# Patient Record
Sex: MALE | Race: WHITE
[De-identification: names, ages, dates, MRNs, and addresses within clinical notes are randomized per-mention and may not be internally consistent; named-entity substitution may affect disease eponyms.]

---

## 2018-09-01 ENCOUNTER — HOSPITAL ENCOUNTER (INPATIENT)
Dept: HOSPITAL 54 - ER | Age: 48
LOS: 17 days | Discharge: HOME | DRG: 710 | End: 2018-09-18
Attending: NURSE PRACTITIONER | Admitting: NURSE PRACTITIONER
Payer: MEDICAID

## 2018-09-01 VITALS — SYSTOLIC BLOOD PRESSURE: 118 MMHG | DIASTOLIC BLOOD PRESSURE: 83 MMHG

## 2018-09-01 VITALS — SYSTOLIC BLOOD PRESSURE: 124 MMHG | DIASTOLIC BLOOD PRESSURE: 81 MMHG

## 2018-09-01 VITALS — SYSTOLIC BLOOD PRESSURE: 140 MMHG | DIASTOLIC BLOOD PRESSURE: 90 MMHG

## 2018-09-01 VITALS — BODY MASS INDEX: 25.11 KG/M2 | HEIGHT: 67 IN | WEIGHT: 160 LBS

## 2018-09-01 VITALS — DIASTOLIC BLOOD PRESSURE: 78 MMHG | SYSTOLIC BLOOD PRESSURE: 122 MMHG

## 2018-09-01 VITALS — DIASTOLIC BLOOD PRESSURE: 86 MMHG | SYSTOLIC BLOOD PRESSURE: 122 MMHG

## 2018-09-01 VITALS — SYSTOLIC BLOOD PRESSURE: 120 MMHG | DIASTOLIC BLOOD PRESSURE: 84 MMHG

## 2018-09-01 VITALS — DIASTOLIC BLOOD PRESSURE: 83 MMHG | SYSTOLIC BLOOD PRESSURE: 122 MMHG

## 2018-09-01 VITALS — SYSTOLIC BLOOD PRESSURE: 133 MMHG | DIASTOLIC BLOOD PRESSURE: 86 MMHG

## 2018-09-01 DIAGNOSIS — J96.01: ICD-10-CM

## 2018-09-01 DIAGNOSIS — F12.929: ICD-10-CM

## 2018-09-01 DIAGNOSIS — A41.89: Primary | ICD-10-CM

## 2018-09-01 DIAGNOSIS — F43.23: ICD-10-CM

## 2018-09-01 DIAGNOSIS — I10: ICD-10-CM

## 2018-09-01 DIAGNOSIS — K57.21: ICD-10-CM

## 2018-09-01 DIAGNOSIS — K66.8: ICD-10-CM

## 2018-09-01 DIAGNOSIS — K56.7: ICD-10-CM

## 2018-09-01 DIAGNOSIS — R73.9: ICD-10-CM

## 2018-09-01 DIAGNOSIS — F32.9: ICD-10-CM

## 2018-09-01 LAB
ALBUMIN SERPL BCP-MCNC: 3.8 G/DL (ref 3.4–5)
ALP SERPL-CCNC: 92 U/L (ref 46–116)
ALT SERPL W P-5'-P-CCNC: 29 U/L (ref 12–78)
APTT PPP: 22 SEC (ref 23–34)
AST SERPL W P-5'-P-CCNC: 17 U/L (ref 15–37)
BASOPHILS # BLD AUTO: 0.1 /CMM (ref 0–0.2)
BASOPHILS NFR BLD AUTO: 1 % (ref 0–2)
BILIRUB DIRECT SERPL-MCNC: 0.1 MG/DL (ref 0–0.2)
BILIRUB SERPL-MCNC: 0.5 MG/DL (ref 0.2–1)
BUN SERPL-MCNC: 17 MG/DL (ref 7–18)
CALCIUM SERPL-MCNC: 8.6 MG/DL (ref 8.5–10.1)
CHLORIDE SERPL-SCNC: 106 MMOL/L (ref 98–107)
CO2 SERPL-SCNC: 24 MMOL/L (ref 21–32)
CREAT SERPL-MCNC: 1.1 MG/DL (ref 0.6–1.3)
EOSINOPHIL NFR BLD AUTO: 6.4 % (ref 0–6)
GLUCOSE SERPL-MCNC: 199 MG/DL (ref 74–106)
HCT VFR BLD AUTO: 49 % (ref 39–51)
HGB BLD-MCNC: 15.3 G/DL (ref 13.5–17.5)
INR PPP: 0.94 (ref 0.87–1.13)
LIPASE SERPL-CCNC: 1187 U/L (ref 73–393)
LYMPHOCYTES NFR BLD AUTO: 3.3 /CMM (ref 0.8–4.8)
LYMPHOCYTES NFR BLD AUTO: 41.5 % (ref 20–44)
MCHC RBC AUTO-ENTMCNC: 32 G/DL (ref 31–36)
MCV RBC AUTO: 91 FL (ref 80–96)
MONOCYTES NFR BLD AUTO: 0.7 /CMM (ref 0.1–1.3)
MONOCYTES NFR BLD AUTO: 9.2 % (ref 2–12)
NEUTROPHILS # BLD AUTO: 3.3 /CMM (ref 1.8–8.9)
NEUTROPHILS NFR BLD AUTO: 41.9 % (ref 43–81)
PLATELET # BLD AUTO: 298 /CMM (ref 150–450)
POTASSIUM SERPL-SCNC: 3.9 MMOL/L (ref 3.5–5.1)
PROT SERPL-MCNC: 7.3 G/DL (ref 6.4–8.2)
RBC # BLD AUTO: 5.36 MIL/UL (ref 4.5–6)
RDW COEFFICIENT OF VARIATION: 12.9 (ref 11.5–15)
SODIUM SERPL-SCNC: 141 MMOL/L (ref 136–145)
TROPONIN I SERPL-MCNC: < 0.017 NG/ML (ref 0–0.06)
WBC NRBC COR # BLD AUTO: 7.8 K/UL (ref 4.3–11)

## 2018-09-01 PROCEDURE — A4216 STERILE WATER/SALINE, 10 ML: HCPCS

## 2018-09-01 PROCEDURE — C1751 CATH, INF, PER/CENT/MIDLINE: HCPCS

## 2018-09-01 PROCEDURE — A6402 STERILE GAUZE <= 16 SQ IN: HCPCS

## 2018-09-01 PROCEDURE — C9113 INJ PANTOPRAZOLE SODIUM, VIA: HCPCS

## 2018-09-01 PROCEDURE — 0DTN0ZZ RESECTION OF SIGMOID COLON, OPEN APPROACH: ICD-10-PCS | Performed by: SURGERY

## 2018-09-01 PROCEDURE — A4362 SOLID SKIN BARRIER: HCPCS

## 2018-09-01 PROCEDURE — Z7610: HCPCS

## 2018-09-01 PROCEDURE — A6253 ABSORPT DRG > 48 SQ IN W/O B: HCPCS

## 2018-09-01 PROCEDURE — 0D1N0Z4 BYPASS SIGMOID COLON TO CUTANEOUS, OPEN APPROACH: ICD-10-PCS | Performed by: SURGERY

## 2018-09-01 PROCEDURE — A6403 STERILE GAUZE>16 <= 48 SQ IN: HCPCS

## 2018-09-01 PROCEDURE — 0DTJ4ZZ RESECTION OF APPENDIX, PERCUTANEOUS ENDOSCOPIC APPROACH: ICD-10-PCS | Performed by: SURGERY

## 2018-09-01 RX ADMIN — Medication SCH MG: at 17:00

## 2018-09-01 RX ADMIN — SODIUM CHLORIDE, SODIUM LACTATE, POTASSIUM CHLORIDE, AND CALCIUM CHLORIDE PRN MLS/HR: .6; .31; .03; .02 INJECTION, SOLUTION INTRAVENOUS at 17:27

## 2018-09-01 RX ADMIN — PIPERACILLIN SODIUM AND TAZOBACTAM SODIUM SCH MLS/HR: .375; 3 INJECTION, POWDER, LYOPHILIZED, FOR SOLUTION INTRAVENOUS at 18:01

## 2018-09-01 RX ADMIN — DEXTROSE MONOHYDRATE PRN MG: 50 INJECTION, SOLUTION INTRAVENOUS at 23:09

## 2018-09-01 RX ADMIN — PIPERACILLIN SODIUM AND TAZOBACTAM SODIUM SCH MLS/HR: .375; 3 INJECTION, POWDER, LYOPHILIZED, FOR SOLUTION INTRAVENOUS at 23:14

## 2018-09-01 RX ADMIN — HYDROMORPHONE HYDROCHLORIDE PRN MG: 1 INJECTION, SOLUTION INTRAMUSCULAR; INTRAVENOUS; SUBCUTANEOUS at 23:06

## 2018-09-01 NOTE — NUR
TELE RN CLOSING NOTES



PATIENT IN BED ASLEEP AT MODERATE HIGH BACKREST POSITION AT THIS TIME, EASILY AROUSABLE. ON 
0N 02 VIA N./C @ 3LPM AT THIS TIME, BREATHING EVEN AND UNLABORED. ON TELE-MONITORING WITH 
CURRENT READING OF SR WITH HR OF 88-95. SURGICAL INCISIONS ON UPPER MID ABDOMEN AND 
UMBILICUS AREA INTACT AND CLEAN WITH NO ACTIVE BLEEDING NOTED. PT WITH 2 DAPHNEY DRAINS: LEFT 
UPPER QUADRANT #1 WITH 20ML OUTPUT  AND LOWER MID ABDOMEN  #2 WITH 15ML OUTPUT, BOTH WITH 
SEROSANGUINEOUS DRAINAGE. DRESSINGS TO BOTH DAPHNEY DRAINS C/D/I. PT WITH COLOSTOMY IN PLACE, NO 
OUTPUT NOTED AT THIS TIME. IV ACCESS ON LEFT AC g#18 INTACT AND PATENT, IVF OF LR @ 140ML 
/HR RUNNING AT THIS TIME X 48HRS THEN SWITCH TO D5 1/2 NS + 20 MEQ KCL @ 100ML/HR. ALL 
SAFETY MEASURES KEPT IN PLACE. BED IN LOWEST LOCK POSITION WITH SIDE-RAILS UP X 2. CALL 
LIGHT WITHIN REACH. WILL ENDORSE TO NIGHT SHIFT NURSE FOR FANTA.

## 2018-09-01 NOTE — NUR
TELE/RN NOTES



PT C/O DULL PAIN TO ENTIRE ABDOMEN. REQUESTING PAIN MEDICATION. ADMINISTERED DILAUDID AS 
ORDERED. UPON ADMINISTRATION, PT C/O NAUSEA. ADMINISTERED PRN ZOFRAN AS WELL.

## 2018-09-01 NOTE — NUR
TELE/RN NOTES



EMPTIED 20ML FROM DAPHNEY DRAIN #1 AND 80ML FROM DAPHNEY DRAIN #2. SEROSANGUINOUS OUTPUT

## 2018-09-01 NOTE — NUR
TELE/RN OPENING NOTES



PT RECEIVED ASLEEP, OPENS EYES TO NAME/ TOUCH. ON 3L O2 VIA NC, BREATHING EVEN AND 
UNLABORED. NO C/O PAIN OR SOB AT THIS TIME. ON TELE MONITOR SHOWING SR 97. IV TO LAC PATENT 
AND INTACT RUNNING IVF AS ORDERED. LAW CATH DRAINING TO GRAVITY. BED IN LOW/LOCKED 
POSITION WITH CALL LIGHT IN REACH. BILATERAL UPPER SIDE RAILS IN PLACE. WILL CONTINUE TO 
MONITOR

## 2018-09-01 NOTE — NUR
BIB ; ABD PAIN X 30 MIN. NOTED KAILYN, IN DISTRESS, PALE IN COLOR. MD AT 
BS FOR EVAL. IV ACCESS PTA. BLOOD DRAWN FOR LABS. SAFETY AND COMFORT MEASURES 
PROVIDED. WILL MONITOR.

## 2018-09-01 NOTE — NUR
PT VERBALIZES UNDERSTANDING OF THE PROCEDURE, CONSENTS SIGNED FOR BLOOD 
TRANSFUSION AND APPENDECTOMY PROCEDURE.

## 2018-09-02 VITALS — SYSTOLIC BLOOD PRESSURE: 104 MMHG | DIASTOLIC BLOOD PRESSURE: 60 MMHG

## 2018-09-02 VITALS — DIASTOLIC BLOOD PRESSURE: 86 MMHG | SYSTOLIC BLOOD PRESSURE: 138 MMHG

## 2018-09-02 VITALS — SYSTOLIC BLOOD PRESSURE: 126 MMHG | DIASTOLIC BLOOD PRESSURE: 85 MMHG

## 2018-09-02 VITALS — SYSTOLIC BLOOD PRESSURE: 132 MMHG | DIASTOLIC BLOOD PRESSURE: 72 MMHG

## 2018-09-02 VITALS — SYSTOLIC BLOOD PRESSURE: 120 MMHG | DIASTOLIC BLOOD PRESSURE: 72 MMHG

## 2018-09-02 LAB
ALBUMIN SERPL BCP-MCNC: 2.3 G/DL (ref 3.4–5)
ALP SERPL-CCNC: 49 U/L (ref 46–116)
ALT SERPL W P-5'-P-CCNC: 27 U/L (ref 12–78)
AST SERPL W P-5'-P-CCNC: 22 U/L (ref 15–37)
BASOPHILS # BLD AUTO: 0 /CMM (ref 0–0.2)
BASOPHILS NFR BLD AUTO: 0 % (ref 0–2)
BILIRUB SERPL-MCNC: 1.1 MG/DL (ref 0.2–1)
BUN SERPL-MCNC: 11 MG/DL (ref 7–18)
CALCIUM SERPL-MCNC: 7.4 MG/DL (ref 8.5–10.1)
CHLORIDE SERPL-SCNC: 106 MMOL/L (ref 98–107)
CHOLEST SERPL-MCNC: 77 MG/DL (ref ?–200)
CO2 SERPL-SCNC: 28 MMOL/L (ref 21–32)
CREAT SERPL-MCNC: 1.1 MG/DL (ref 0.6–1.3)
EOSINOPHIL NFR BLD AUTO: 0 % (ref 0–6)
GLUCOSE SERPL-MCNC: 130 MG/DL (ref 74–106)
HCT VFR BLD AUTO: 49 % (ref 39–51)
HDLC SERPL-MCNC: 37 MG/DL (ref 40–60)
HGB BLD-MCNC: 15.4 G/DL (ref 13.5–17.5)
LDLC SERPL DIRECT ASSAY-MCNC: 31 MG/DL (ref 0–99)
LIPASE SERPL-CCNC: 82 U/L (ref 73–393)
LYMPHOCYTES NFR BLD AUTO: 0.5 /CMM (ref 0.8–4.8)
LYMPHOCYTES NFR BLD AUTO: 3.7 % (ref 20–44)
LYMPHOCYTES NFR BLD MANUAL: 8 % (ref 16–48)
MAGNESIUM SERPL-MCNC: 1.3 MG/DL (ref 1.8–2.4)
MCHC RBC AUTO-ENTMCNC: 32 G/DL (ref 31–36)
MCV RBC AUTO: 92 FL (ref 80–96)
METAMYELOCYTES NFR BLD MANUAL: 1 % (ref 0–0)
MONOCYTES NFR BLD AUTO: 0.4 /CMM (ref 0.1–1.3)
MONOCYTES NFR BLD AUTO: 2.6 % (ref 2–12)
MONOCYTES NFR BLD MANUAL: 4 % (ref 0–11)
MYELOCYTES NFR BLD MANUAL: 3 % (ref 0–0)
NEUTROPHILS # BLD AUTO: 13 /CMM (ref 1.8–8.9)
NEUTROPHILS NFR BLD AUTO: 93.7 % (ref 43–81)
NEUTS BAND NFR BLD MANUAL: 37 % (ref 0–5)
NEUTS SEG NFR BLD MANUAL: 47 % (ref 42–76)
PHOSPHATE SERPL-MCNC: 3.7 MG/DL (ref 2.5–4.9)
PLATELET # BLD AUTO: 179 /CMM (ref 150–450)
POTASSIUM SERPL-SCNC: 4.1 MMOL/L (ref 3.5–5.1)
PROT SERPL-MCNC: 5.3 G/DL (ref 6.4–8.2)
RBC # BLD AUTO: 5.28 MIL/UL (ref 4.5–6)
RDW COEFFICIENT OF VARIATION: 13.1 (ref 11.5–15)
SODIUM SERPL-SCNC: 141 MMOL/L (ref 136–145)
TRIGL SERPL-MCNC: 50 MG/DL (ref 30–150)
TSH SERPL DL<=0.005 MIU/L-ACNC: 1.09 UIU/ML (ref 0.36–3.74)
WBC NRBC COR # BLD AUTO: 13.9 K/UL (ref 4.3–11)

## 2018-09-02 RX ADMIN — HYDROMORPHONE HYDROCHLORIDE PRN MG: 1 INJECTION, SOLUTION INTRAMUSCULAR; INTRAVENOUS; SUBCUTANEOUS at 17:10

## 2018-09-02 RX ADMIN — SODIUM CHLORIDE, SODIUM LACTATE, POTASSIUM CHLORIDE, AND CALCIUM CHLORIDE PRN MLS/HR: .6; .31; .03; .02 INJECTION, SOLUTION INTRAVENOUS at 09:21

## 2018-09-02 RX ADMIN — MAGNESIUM SULFATE IN DEXTROSE SCH MLS/HR: 10 INJECTION, SOLUTION INTRAVENOUS at 12:12

## 2018-09-02 RX ADMIN — PIPERACILLIN SODIUM AND TAZOBACTAM SODIUM SCH MLS/HR: .375; 3 INJECTION, POWDER, LYOPHILIZED, FOR SOLUTION INTRAVENOUS at 11:36

## 2018-09-02 RX ADMIN — MAGNESIUM SULFATE IN DEXTROSE SCH MLS/HR: 10 INJECTION, SOLUTION INTRAVENOUS at 15:38

## 2018-09-02 RX ADMIN — SODIUM CHLORIDE, SODIUM LACTATE, POTASSIUM CHLORIDE, AND CALCIUM CHLORIDE PRN MLS/HR: .6; .31; .03; .02 INJECTION, SOLUTION INTRAVENOUS at 01:23

## 2018-09-02 RX ADMIN — HYDROMORPHONE HYDROCHLORIDE PRN MG: 1 INJECTION, SOLUTION INTRAMUSCULAR; INTRAVENOUS; SUBCUTANEOUS at 06:19

## 2018-09-02 RX ADMIN — PIPERACILLIN SODIUM AND TAZOBACTAM SODIUM SCH MLS/HR: .375; 3 INJECTION, POWDER, LYOPHILIZED, FOR SOLUTION INTRAVENOUS at 06:11

## 2018-09-02 RX ADMIN — Medication SCH MG: at 08:38

## 2018-09-02 RX ADMIN — HYDROMORPHONE HYDROCHLORIDE PRN MG: 1 INJECTION, SOLUTION INTRAMUSCULAR; INTRAVENOUS; SUBCUTANEOUS at 12:12

## 2018-09-02 RX ADMIN — SODIUM CHLORIDE SCH MG: 9 INJECTION, SOLUTION INTRAVENOUS at 09:21

## 2018-09-02 RX ADMIN — MAGNESIUM SULFATE IN DEXTROSE SCH MLS/HR: 10 INJECTION, SOLUTION INTRAVENOUS at 13:14

## 2018-09-02 RX ADMIN — DEXTROSE MONOHYDRATE PRN MG: 50 INJECTION, SOLUTION INTRAVENOUS at 06:17

## 2018-09-02 RX ADMIN — HYDROMORPHONE HYDROCHLORIDE PRN MG: 1 INJECTION, SOLUTION INTRAMUSCULAR; INTRAVENOUS; SUBCUTANEOUS at 21:12

## 2018-09-02 RX ADMIN — Medication SCH MG: at 16:48

## 2018-09-02 RX ADMIN — DEXTROSE MONOHYDRATE SCH MLS/HR: 50 INJECTION, SOLUTION INTRAVENOUS at 14:33

## 2018-09-02 RX ADMIN — MAGNESIUM SULFATE IN DEXTROSE SCH MLS/HR: 10 INJECTION, SOLUTION INTRAVENOUS at 16:48

## 2018-09-02 RX ADMIN — DEXTROSE MONOHYDRATE PRN MG: 50 INJECTION, SOLUTION INTRAVENOUS at 21:03

## 2018-09-02 RX ADMIN — PIPERACILLIN SODIUM AND TAZOBACTAM SODIUM SCH MLS/HR: .375; 3 INJECTION, POWDER, LYOPHILIZED, FOR SOLUTION INTRAVENOUS at 18:10

## 2018-09-02 RX ADMIN — DEXTROSE MONOHYDRATE PRN MG: 50 INJECTION, SOLUTION INTRAVENOUS at 12:12

## 2018-09-02 RX ADMIN — DEXTROSE MONOHYDRATE SCH MLS/HR: 50 INJECTION, SOLUTION INTRAVENOUS at 21:14

## 2018-09-02 NOTE — NUR
RN OPENING NOTES



RECEIVED REPORT FROM ISABELLA SERVIN. FOUND Pt AWAKE, RESTING IN BED. NO S/S OF ACUTE 
DISTRESS OR SEVERE SOB NOTED. Pt IS A/OX3, VERBAL, ABLE TO MAKE NEEDS KNOWN. ON TELE 
MONITOR, HR 96. DAPHNEY DRAIN #1 & DAPHNEY DRAIN #2 IN PLACE AND INTACT, NO SIGNS OF BLEEDING AROUND 
SURGICAL WOUND SITE. COLOSTOMY BAG IN PLACE. LAW CATHETER IN PLACE DRAINING WELL. IV 
ACCESS ON LAC #18G, IVF LR @140ML/HR, INFUSING WELL. STILL NPO PER SURGERY. SAFETY MEASURES 
IN PLACE. BED LOW, LOCKED, HOB ELEVATED, SIDE RAILS UP, CALL LIGHT AND BEDSIDE TABLE WITHIN 
REACH. WILL CONTINUE TO MONITOR Pt THROUGHOUT THE NIGHT FOR SAFETY.

## 2018-09-02 NOTE — NUR
RN NOTES

PT IS LAYING DOWN IN BED, RESTING COMFORTABLY. PT ON RA, RESPIRATIONS ARE EVEN AND 
UNLABORED. IV ON LAC INTACT AND RUNNING NS @ 140ML/HR. COLOSTOMY IS IN PLACE. DAPHNEY DRAINS IN 
PLACE AND DRAINING SEROSANGUINEOUS FLUID. LAW CATHETER IS IN PLACE AND DRAINING TO 
GRAVITY. TELE MONITOR SHOWS SINUS RHYTHM 95. NO SIGNS OF DISTRESS NOTED. SAFETY MEASURES ARE 
IN PLACE, CALL LIGHT IS IN REACH. WILL CONTINUE TO MONITOR.

## 2018-09-02 NOTE — NUR
TELE/RN CLOSING NOTES



PT RESTING WITH EYES CLOSED. A/OX3. REMAINS ON 3L O2 VIA NC, BREATHING EVEN AND UNLABORED. 
ABDOMINAL PAIN MANAGED WITH PRN DILAUDID. SOB AT THIS TIME. ON TELE MONITOR SHOWING SR 94. 
IV TO LAC PATENT AND INTACT RUNNING IVF AS ORDERED. LAW CATH DRAINING TO GRAVITY. DAPHNEY DRAIN 
#2 EMPTIED 50ML AT 0600. DRESSINGS TO ABDOMEN C/D/I. NO SIGNFICANT CHANGES OVERNIGHT. KEPT 
PT COMFORTABLE DURING SHIFT. ALL NEEDS MET. BED IN LOW/LOCKED POSITION WITH CALL LIGHT IN 
REACH. BILATERAL UPPER SIDE RAILS IN PLACE. WILL ENDORSE TO DAY SHIFT RN FANTA.

-------------------------------------------------------------------------------

Addendum: 09/02/18 at 0703 by RK GONSALVES RN

-------------------------------------------------------------------------------

TOTAL OUTPUT FROM DAPHNEY DRAIN #1 = 60 ML

TOTAL OUTPUT FROM DAPHNEY DRAIN #2 = 220 ML

## 2018-09-02 NOTE — NUR
RN NOTES

PT IS LAYING DOWN IN BED, RESTING COMFORTABLY. PT ON 3L O2, RESPIRATIONS ARE EVEN AND 
UNLABORED. IV ON LAC INTACT AND PATENT, RUNNING LR @ 140ML/HR. ALL MEDS WERE GIVEN AS 
ORDERED AND PT NEEDS MET. DAPHNEY DRAINS IN PLACE AND DRAINING SEROSANGUINEOUS FLUID. COLOSTOMY 
IN PLACE, SITE IS CLEAN AND STOMA IS PINK AND SHINY. LAW CATHETER IS IN PLACE AND DRAINING 
TO GRAVITY. NO SIGNS OF DISTRESS NOTED. SAFETY MEASURES ARE IN PLACE, CALL LIGHT IS IN 
REACH. WILL ENDORSE TO NIGHT SHIFT RN FOR CONTINUITY OF CARE.

## 2018-09-02 NOTE — NUR
RN NOTES

BLOOD CULTURE RESULTS SHOW GRAM POSITIVE COCCI IN CHAINS. MEGHA MAYORGA NP MADE AWARE AND 
ORDERED CONSULT FOR ID.

## 2018-09-03 VITALS — DIASTOLIC BLOOD PRESSURE: 88 MMHG | SYSTOLIC BLOOD PRESSURE: 140 MMHG

## 2018-09-03 VITALS — DIASTOLIC BLOOD PRESSURE: 91 MMHG | SYSTOLIC BLOOD PRESSURE: 132 MMHG

## 2018-09-03 VITALS — DIASTOLIC BLOOD PRESSURE: 85 MMHG | SYSTOLIC BLOOD PRESSURE: 134 MMHG

## 2018-09-03 VITALS — SYSTOLIC BLOOD PRESSURE: 135 MMHG | DIASTOLIC BLOOD PRESSURE: 82 MMHG

## 2018-09-03 VITALS — DIASTOLIC BLOOD PRESSURE: 92 MMHG | SYSTOLIC BLOOD PRESSURE: 149 MMHG

## 2018-09-03 LAB
ALBUMIN SERPL BCP-MCNC: 2.2 G/DL (ref 3.4–5)
ALP SERPL-CCNC: 59 U/L (ref 46–116)
ALT SERPL W P-5'-P-CCNC: 20 U/L (ref 12–78)
APPEARANCE UR: CLEAR
AST SERPL W P-5'-P-CCNC: 22 U/L (ref 15–37)
BASOPHILS # BLD AUTO: 0 /CMM (ref 0–0.2)
BASOPHILS NFR BLD AUTO: 0 % (ref 0–2)
BILIRUB SERPL-MCNC: 0.8 MG/DL (ref 0.2–1)
BILIRUB UR QL STRIP: NEGATIVE
BUN SERPL-MCNC: 10 MG/DL (ref 7–18)
CALCIUM SERPL-MCNC: 8 MG/DL (ref 8.5–10.1)
CHLORIDE SERPL-SCNC: 103 MMOL/L (ref 98–107)
CO2 SERPL-SCNC: 29 MMOL/L (ref 21–32)
COLOR UR: YELLOW
CREAT SERPL-MCNC: 1 MG/DL (ref 0.6–1.3)
EOSINOPHIL NFR BLD AUTO: 0 % (ref 0–6)
GLUCOSE SERPL-MCNC: 143 MG/DL (ref 74–106)
GLUCOSE UR STRIP-MCNC: NEGATIVE MG/DL
HCT VFR BLD AUTO: 46 % (ref 39–51)
HGB BLD-MCNC: 14.8 G/DL (ref 13.5–17.5)
HGB UR QL STRIP: (no result) ERY/UL
KETONES UR STRIP-MCNC: (no result) MG/DL
LEUKOCYTE ESTERASE UR QL STRIP: NEGATIVE
LYMPHOCYTES NFR BLD AUTO: 0.6 /CMM (ref 0.8–4.8)
LYMPHOCYTES NFR BLD AUTO: 4.5 % (ref 20–44)
LYMPHOCYTES NFR BLD MANUAL: 2 % (ref 16–48)
MAGNESIUM SERPL-MCNC: 2.3 MG/DL (ref 1.8–2.4)
MCHC RBC AUTO-ENTMCNC: 33 G/DL (ref 31–36)
MCV RBC AUTO: 91 FL (ref 80–96)
MONOCYTES NFR BLD AUTO: 0.2 /CMM (ref 0.1–1.3)
MONOCYTES NFR BLD AUTO: 1.2 % (ref 2–12)
MONOCYTES NFR BLD MANUAL: 3 % (ref 0–11)
NEUTROPHILS # BLD AUTO: 12.9 /CMM (ref 1.8–8.9)
NEUTROPHILS NFR BLD AUTO: 94.3 % (ref 43–81)
NEUTS BAND NFR BLD MANUAL: 33 % (ref 0–5)
NEUTS SEG NFR BLD MANUAL: 62 % (ref 42–76)
NITRITE UR QL STRIP: NEGATIVE
PH UR STRIP: 6 [PH] (ref 5–8)
PLATELET # BLD AUTO: 151 /CMM (ref 150–450)
POTASSIUM SERPL-SCNC: 3.9 MMOL/L (ref 3.5–5.1)
PROT SERPL-MCNC: 5.4 G/DL (ref 6.4–8.2)
PROT UR QL STRIP: (no result) MG/DL
RBC # BLD AUTO: 4.98 MIL/UL (ref 4.5–6)
RBC #/AREA URNS HPF: (no result) /HPF (ref 0–2)
RDW COEFFICIENT OF VARIATION: 13.3 (ref 11.5–15)
SODIUM SERPL-SCNC: 138 MMOL/L (ref 136–145)
UROBILINOGEN UR STRIP-MCNC: 1 EU/DL
WBC #/AREA URNS HPF: (no result) /HPF (ref 0–3)
WBC NRBC COR # BLD AUTO: 13.6 K/UL (ref 4.3–11)

## 2018-09-03 RX ADMIN — HYDROMORPHONE HYDROCHLORIDE PRN MG: 1 INJECTION, SOLUTION INTRAMUSCULAR; INTRAVENOUS; SUBCUTANEOUS at 20:45

## 2018-09-03 RX ADMIN — PIPERACILLIN SODIUM AND TAZOBACTAM SODIUM SCH MLS/HR: .375; 3 INJECTION, POWDER, LYOPHILIZED, FOR SOLUTION INTRAVENOUS at 17:35

## 2018-09-03 RX ADMIN — Medication SCH MG: at 08:58

## 2018-09-03 RX ADMIN — HYDROMORPHONE HYDROCHLORIDE PRN MG: 1 INJECTION, SOLUTION INTRAMUSCULAR; INTRAVENOUS; SUBCUTANEOUS at 06:40

## 2018-09-03 RX ADMIN — DEXTROSE MONOHYDRATE PRN MG: 50 INJECTION, SOLUTION INTRAVENOUS at 20:47

## 2018-09-03 RX ADMIN — SODIUM CHLORIDE, SODIUM LACTATE, POTASSIUM CHLORIDE, AND CALCIUM CHLORIDE PRN MLS/HR: .6; .31; .03; .02 INJECTION, SOLUTION INTRAVENOUS at 17:50

## 2018-09-03 RX ADMIN — SODIUM CHLORIDE, SODIUM LACTATE, POTASSIUM CHLORIDE, AND CALCIUM CHLORIDE PRN MLS/HR: .6; .31; .03; .02 INJECTION, SOLUTION INTRAVENOUS at 00:51

## 2018-09-03 RX ADMIN — SODIUM CHLORIDE SCH MG: 9 INJECTION, SOLUTION INTRAVENOUS at 09:23

## 2018-09-03 RX ADMIN — DEXTROSE MONOHYDRATE SCH MLS/HR: 50 INJECTION, SOLUTION INTRAVENOUS at 14:36

## 2018-09-03 RX ADMIN — DEXTROSE MONOHYDRATE SCH MLS/HR: 50 INJECTION, SOLUTION INTRAVENOUS at 04:44

## 2018-09-03 RX ADMIN — DEXTROSE MONOHYDRATE PRN MG: 50 INJECTION, SOLUTION INTRAVENOUS at 12:35

## 2018-09-03 RX ADMIN — DEXTROSE MONOHYDRATE PRN MG: 50 INJECTION, SOLUTION INTRAVENOUS at 06:40

## 2018-09-03 RX ADMIN — SODIUM CHLORIDE, SODIUM LACTATE, POTASSIUM CHLORIDE, AND CALCIUM CHLORIDE PRN MLS/HR: .6; .31; .03; .02 INJECTION, SOLUTION INTRAVENOUS at 12:45

## 2018-09-03 RX ADMIN — PIPERACILLIN SODIUM AND TAZOBACTAM SODIUM SCH MLS/HR: .375; 3 INJECTION, POWDER, LYOPHILIZED, FOR SOLUTION INTRAVENOUS at 00:42

## 2018-09-03 RX ADMIN — HYDROMORPHONE HYDROCHLORIDE PRN MG: 1 INJECTION, SOLUTION INTRAMUSCULAR; INTRAVENOUS; SUBCUTANEOUS at 16:32

## 2018-09-03 RX ADMIN — HYDROMORPHONE HYDROCHLORIDE PRN MG: 1 INJECTION, SOLUTION INTRAMUSCULAR; INTRAVENOUS; SUBCUTANEOUS at 12:37

## 2018-09-03 RX ADMIN — HYDROMORPHONE HYDROCHLORIDE PRN MG: 1 INJECTION, SOLUTION INTRAMUSCULAR; INTRAVENOUS; SUBCUTANEOUS at 02:09

## 2018-09-03 RX ADMIN — PIPERACILLIN SODIUM AND TAZOBACTAM SODIUM SCH MLS/HR: .375; 3 INJECTION, POWDER, LYOPHILIZED, FOR SOLUTION INTRAVENOUS at 06:13

## 2018-09-03 RX ADMIN — PIPERACILLIN SODIUM AND TAZOBACTAM SODIUM SCH MLS/HR: .375; 3 INJECTION, POWDER, LYOPHILIZED, FOR SOLUTION INTRAVENOUS at 11:43

## 2018-09-03 RX ADMIN — Medication SCH MG: at 16:39

## 2018-09-03 RX ADMIN — DEXTROSE MONOHYDRATE SCH MLS/HR: 50 INJECTION, SOLUTION INTRAVENOUS at 22:21

## 2018-09-03 NOTE — NUR
TELE RN OPENING NOTE



Assignment change, received report from SCOTTIE Ratliff. Patient was seen lying in bed AAOx4, 
breathing on RA with no SOB, and no signs of acute distress. Patient reports 3/10 abdominal 
pain and currently does not need any pain relief measures. Two midline abdominal incisions 
are noted, both covered with bandages that are clean, dry, and intact. Two DAPHNEY drains are 
draining serosanguinous fluid (drain #two with 20 ml output, drain #one with scant output). 
Colostomy is on the left side, stoma is beefy red. Ward cath is draining clear, yellow 
urine. 20 mEq of Potassium Chloride in D5 1/2NS is running at 100ml/hr through the left 
upper arm IV with no signs of leaking or infiltration. Bed is low/locked, two side rails up, 
and call bell within reach. Will continue to monitor.

## 2018-09-03 NOTE — NUR
TELE RN NOTE - Dilaudid, Zofran



Patient reported 8/10 abdominal pain (s/p exploratory laparotomy with colon resection) and 
requested Dilaudid for pain relief, along with Zofran since he experiences nausea with 
opioids. 1mg IV Dilaudid was given along with 4mg IV Zofran per orders. Will continue to 
monitor.

## 2018-09-03 NOTE — NUR
RN CLOSING NOTES



NO SIGNIFICANT CHANGES IN Pt's CONDITION. Pt REMAINS STABLE AT THIS TIME. NO S/S OF ACUTE 
DISTRESS OR SOB NOTED DURING THE NIGHT. ALL NEEDS MET AND ATTENDED. RESPIRATIONS EVEN AND 
UNLABORED WITH EQUAL CHEST RISE AND FALL. TELE READING SR 96. SAFETY MEASURES IN PLACE. BED 
LOW, LOCKED, HOB ELEVATED, SIDE RAILS UP, CALL LIGHT AND BEDSIDE TABLE WITHIN REACH. WILL 
ENDORSE TO DAYSHIFT RN FOR Pt's FANTA.

## 2018-09-03 NOTE — NUR
RN NOTES

PT AWAKE, VERBALLY RESPONSIVE, ABLE TO MAKE NEEDS KNOWN, RESTING IN BED. NO S/S OF ACUTE 
DISTRESS OR  SOB NOTED. ON TELE MONITOR WITH HR 96. DAPHNEY DRAIN #1 & DAPHNEY DRAIN #2 IN PLACE AND 
INTACT, NO SIGNS OF BLEEDING AROUND SURGICAL WOUND SITE. COLOSTOMY BAG IN PLACE. LAW 
CATHETER IN PLACE DRAINING WELL. IV ACCESS ON FLAKITA 22G, INF INFUSING AND TOLERATING WELL. 
STILL NPO AT THIS TIME. PATIENT WAS ABLE TO AMBULATE ONCE FOR A FEW FEET,  BOWEL SOUNDS 
PRESENT, SAFETY MEASURES IN PLACE. BED LOW, LOCKED, HOB ELEVATED, SIDE RAILS UP, CALL LIGHT 
AND BEDSIDE TABLE WITHIN REACH. GIRLFRIEND AT BEDSIDE, WILL ENDORSE TO NIGHT SHIFT FOR FANTA.

## 2018-09-03 NOTE — NUR
RN NOTES

PATIENT A/OX4, DENIES PAIN AT THIS TIME. BREATHING EVEN AND UNLABORED, NO SOB NOTED, KEPT 
COMFORTABLE, DAPHNEY DRAINS PATENT AND DRAINING WELL. COLOSTOMY EMPTY. NEEDS ATTENDED AND MET, 
CALL LIGHT WITHIN REACH, WILL CONTINUE TO MONITOR.

## 2018-09-04 VITALS — DIASTOLIC BLOOD PRESSURE: 89 MMHG | SYSTOLIC BLOOD PRESSURE: 138 MMHG

## 2018-09-04 VITALS — SYSTOLIC BLOOD PRESSURE: 137 MMHG | DIASTOLIC BLOOD PRESSURE: 95 MMHG

## 2018-09-04 VITALS — DIASTOLIC BLOOD PRESSURE: 81 MMHG | SYSTOLIC BLOOD PRESSURE: 126 MMHG

## 2018-09-04 VITALS — SYSTOLIC BLOOD PRESSURE: 141 MMHG | DIASTOLIC BLOOD PRESSURE: 96 MMHG

## 2018-09-04 VITALS — SYSTOLIC BLOOD PRESSURE: 140 MMHG | DIASTOLIC BLOOD PRESSURE: 97 MMHG

## 2018-09-04 VITALS — SYSTOLIC BLOOD PRESSURE: 149 MMHG | DIASTOLIC BLOOD PRESSURE: 95 MMHG

## 2018-09-04 VITALS — DIASTOLIC BLOOD PRESSURE: 96 MMHG | SYSTOLIC BLOOD PRESSURE: 141 MMHG

## 2018-09-04 LAB
BASOPHILS # BLD AUTO: 0 /CMM (ref 0–0.2)
BASOPHILS NFR BLD AUTO: 0 % (ref 0–2)
BUN SERPL-MCNC: 10 MG/DL (ref 7–18)
CALCIUM SERPL-MCNC: 8 MG/DL (ref 8.5–10.1)
CHLORIDE SERPL-SCNC: 104 MMOL/L (ref 98–107)
CO2 SERPL-SCNC: 30 MMOL/L (ref 21–32)
CREAT SERPL-MCNC: 0.9 MG/DL (ref 0.6–1.3)
EOSINOPHIL NFR BLD AUTO: 0.1 % (ref 0–6)
GLUCOSE SERPL-MCNC: 113 MG/DL (ref 74–106)
HCT VFR BLD AUTO: 43 % (ref 39–51)
HGB BLD-MCNC: 13.8 G/DL (ref 13.5–17.5)
LYMPHOCYTES NFR BLD AUTO: 0.6 /CMM (ref 0.8–4.8)
LYMPHOCYTES NFR BLD AUTO: 4.2 % (ref 20–44)
MAGNESIUM SERPL-MCNC: 2.1 MG/DL (ref 1.8–2.4)
MCHC RBC AUTO-ENTMCNC: 32 G/DL (ref 31–36)
MCV RBC AUTO: 91 FL (ref 80–96)
MONOCYTES NFR BLD AUTO: 0.6 /CMM (ref 0.1–1.3)
MONOCYTES NFR BLD AUTO: 4.3 % (ref 2–12)
NEUTROPHILS # BLD AUTO: 13.7 /CMM (ref 1.8–8.9)
NEUTROPHILS NFR BLD AUTO: 91.4 % (ref 43–81)
PHOSPHATE SERPL-MCNC: 1.4 MG/DL (ref 2.5–4.9)
PLATELET # BLD AUTO: 160 /CMM (ref 150–450)
POTASSIUM SERPL-SCNC: 3.7 MMOL/L (ref 3.5–5.1)
RBC # BLD AUTO: 4.7 MIL/UL (ref 4.5–6)
RDW COEFFICIENT OF VARIATION: 13.1 (ref 11.5–15)
SODIUM SERPL-SCNC: 139 MMOL/L (ref 136–145)
WBC NRBC COR # BLD AUTO: 15 K/UL (ref 4.3–11)

## 2018-09-04 RX ADMIN — HYDROMORPHONE HYDROCHLORIDE PRN MG: 1 INJECTION, SOLUTION INTRAMUSCULAR; INTRAVENOUS; SUBCUTANEOUS at 15:53

## 2018-09-04 RX ADMIN — PIPERACILLIN SODIUM AND TAZOBACTAM SODIUM SCH MLS/HR: .375; 3 INJECTION, POWDER, LYOPHILIZED, FOR SOLUTION INTRAVENOUS at 00:13

## 2018-09-04 RX ADMIN — SODIUM CHLORIDE SCH MG: 9 INJECTION, SOLUTION INTRAVENOUS at 08:29

## 2018-09-04 RX ADMIN — DEXTROSE MONOHYDRATE PRN MG: 50 INJECTION, SOLUTION INTRAVENOUS at 23:26

## 2018-09-04 RX ADMIN — DEXTROSE MONOHYDRATE SCH MLS/HR: 50 INJECTION, SOLUTION INTRAVENOUS at 14:16

## 2018-09-04 RX ADMIN — HYDROMORPHONE HYDROCHLORIDE PRN MG: 1 INJECTION, SOLUTION INTRAMUSCULAR; INTRAVENOUS; SUBCUTANEOUS at 02:34

## 2018-09-04 RX ADMIN — Medication PRN EACH: at 12:16

## 2018-09-04 RX ADMIN — Medication SCH MG: at 08:23

## 2018-09-04 RX ADMIN — Medication SCH MG: at 17:46

## 2018-09-04 RX ADMIN — PIPERACILLIN SODIUM AND TAZOBACTAM SODIUM SCH MLS/HR: .375; 3 INJECTION, POWDER, LYOPHILIZED, FOR SOLUTION INTRAVENOUS at 17:47

## 2018-09-04 RX ADMIN — PIPERACILLIN SODIUM AND TAZOBACTAM SODIUM SCH MLS/HR: .375; 3 INJECTION, POWDER, LYOPHILIZED, FOR SOLUTION INTRAVENOUS at 05:41

## 2018-09-04 RX ADMIN — DEXTROSE MONOHYDRATE PRN MG: 50 INJECTION, SOLUTION INTRAVENOUS at 15:52

## 2018-09-04 RX ADMIN — HYDROMORPHONE HYDROCHLORIDE PRN MG: 1 INJECTION, SOLUTION INTRAMUSCULAR; INTRAVENOUS; SUBCUTANEOUS at 23:41

## 2018-09-04 RX ADMIN — PIPERACILLIN SODIUM AND TAZOBACTAM SODIUM SCH MLS/HR: .375; 3 INJECTION, POWDER, LYOPHILIZED, FOR SOLUTION INTRAVENOUS at 23:26

## 2018-09-04 RX ADMIN — DEXTROSE MONOHYDRATE PRN MG: 50 INJECTION, SOLUTION INTRAVENOUS at 03:43

## 2018-09-04 RX ADMIN — DEXTROSE MONOHYDRATE SCH MLS/HR: 50 INJECTION, SOLUTION INTRAVENOUS at 06:19

## 2018-09-04 RX ADMIN — HYDROMORPHONE HYDROCHLORIDE PRN MG: 1 INJECTION, SOLUTION INTRAMUSCULAR; INTRAVENOUS; SUBCUTANEOUS at 07:32

## 2018-09-04 RX ADMIN — PIPERACILLIN SODIUM AND TAZOBACTAM SODIUM SCH MLS/HR: .375; 3 INJECTION, POWDER, LYOPHILIZED, FOR SOLUTION INTRAVENOUS at 11:17

## 2018-09-04 RX ADMIN — HYDROMORPHONE HYDROCHLORIDE PRN MG: 1 INJECTION, SOLUTION INTRAMUSCULAR; INTRAVENOUS; SUBCUTANEOUS at 19:10

## 2018-09-04 RX ADMIN — DEXTROSE MONOHYDRATE SCH MLS/HR: 50 INJECTION, SOLUTION INTRAVENOUS at 21:50

## 2018-09-04 RX ADMIN — SODIUM CHLORIDE, SODIUM LACTATE, POTASSIUM CHLORIDE, AND CALCIUM CHLORIDE PRN MLS/HR: .6; .31; .03; .02 INJECTION, SOLUTION INTRAVENOUS at 21:42

## 2018-09-04 RX ADMIN — SODIUM CHLORIDE, SODIUM LACTATE, POTASSIUM CHLORIDE, AND CALCIUM CHLORIDE PRN MLS/HR: .6; .31; .03; .02 INJECTION, SOLUTION INTRAVENOUS at 06:19

## 2018-09-04 NOTE — NUR
MS/RN OPENING NOTES



PT RECEIVED AWAKE, RESTING IN BED. VISITOR AT BEDSIDE. ON 2L O2 VIA NC, BREATHING EVEN AND 
UNLABORED. DENIES SOB. NOTES PAIN 7-8/10, JUST RECEIVED IV PAIN MEDICATION. INCISION SITES 
CLEAN AND DRY, MAREK. DAPHNEY DRAINS IN PLACE, DRESSING C/D/I, SEROSANGUINOUS OUTPUT NOTED. IV TO 
FLAKITA PATENT AND INTACT. BED IN LOW/LOCKED POSITION, CALL LIGHT IN REACH. BILATERAL UPPER SIDE 
RAILS UP IN PLACE. WILL CONTINUE TO MONITOR

## 2018-09-04 NOTE — NUR
TELE RN NOTE - Zofran



Patient complained of nausea and requested Zofran. 4mg IV Zofran administered per orders.

## 2018-09-04 NOTE — NUR
TELE RN CLOSING NOTE



Patient AAOx4, breathing with 2L NC with no SOB, and no signs of acute distress. Vitals WNL. 
Patient slept well overnight with no complications. Pain has been well managed with IV 
Dilaudid. 20mEq KCl in D5 1/2NS is running through the FLAKITA IV with no signs of leaking or 
infiltration. Two JPs are draining serosanguineous fluid. Colostomy has scant output. Bed is 
low/locked, two side rails up, and call bell within reach. All patient needs attended to 
this shift. Patient care endorsed to day shift nurse.

## 2018-09-04 NOTE — NUR
MS RN CLOSING NOTES

PATIENT IS IN STABLE CONDITION. IN  NO APPARENT DISTRESS. BEDSIDE RAILS ARE UPX2. BED IS 
LOCKED AND LOWERED. ALL NEEDS WERE MET. IV LINE IS INTACT AND PATENT. WILL ENDORSE CARE TO 
NIGHT SHIFT NURSE FOR FANTA.

## 2018-09-04 NOTE — NUR
TELE RN NOTE - Dilaudid



Patient requested pain medication for 8/10 abdominal pain. 1mg IV Dilaudid administered per 
orders for prn pain relief. Patient states that previous dose reduced pain to tolerable 
level at 3/10, but pain returned. Will continue to monitor.

## 2018-09-04 NOTE — NUR
TELE RN NOTE - Dilaudid



Patient reported 8/10 abdominal pain and requested pain medication. 1mg IV Dilaudid 
administered per orders for prn pain relief.

## 2018-09-04 NOTE — NUR
SPOKE TO DR. SUÁREZ. PER DR. SUÁREZ IT IS OK TO ADVANCE DIET TO CLEAR LIQUIDS AND CONTINUE 
ADVANCING AS TOLERATED.

## 2018-09-04 NOTE — NUR
TELE RN OPENING NOTES

RECEIVED PATIENT IN STABLE CONDITION. IN NO APPARENT DISTRESS. BEDSIDE RAILS ARE UPX2. BED 
IS LOCKED AND LOWERED. CALL LIGHT IS WITHIN REACH. IV LINE IS INTACT AND PATENT. WILL 
CONTINUE TO MONITOR PATIENT.

## 2018-09-05 VITALS — SYSTOLIC BLOOD PRESSURE: 139 MMHG | DIASTOLIC BLOOD PRESSURE: 91 MMHG

## 2018-09-05 VITALS — SYSTOLIC BLOOD PRESSURE: 141 MMHG | DIASTOLIC BLOOD PRESSURE: 98 MMHG

## 2018-09-05 VITALS — SYSTOLIC BLOOD PRESSURE: 154 MMHG | DIASTOLIC BLOOD PRESSURE: 98 MMHG

## 2018-09-05 LAB
BASOPHILS # BLD AUTO: 0 /CMM (ref 0–0.2)
BASOPHILS NFR BLD AUTO: 0 % (ref 0–2)
BUN SERPL-MCNC: 15 MG/DL (ref 7–18)
CALCIUM SERPL-MCNC: 7.9 MG/DL (ref 8.5–10.1)
CHLORIDE SERPL-SCNC: 103 MMOL/L (ref 98–107)
CO2 SERPL-SCNC: 27 MMOL/L (ref 21–32)
CREAT SERPL-MCNC: 0.9 MG/DL (ref 0.6–1.3)
EOSINOPHIL NFR BLD AUTO: 0 % (ref 0–6)
GLUCOSE SERPL-MCNC: 143 MG/DL (ref 74–106)
HCT VFR BLD AUTO: 44 % (ref 39–51)
HGB BLD-MCNC: 14.2 G/DL (ref 13.5–17.5)
LYMPHOCYTES NFR BLD AUTO: 0.4 /CMM (ref 0.8–4.8)
LYMPHOCYTES NFR BLD AUTO: 3.2 % (ref 20–44)
LYMPHOCYTES NFR BLD MANUAL: 4 % (ref 16–48)
MAGNESIUM SERPL-MCNC: 2.1 MG/DL (ref 1.8–2.4)
MCHC RBC AUTO-ENTMCNC: 32 G/DL (ref 31–36)
MCV RBC AUTO: 91 FL (ref 80–96)
MONOCYTES NFR BLD AUTO: 0.9 /CMM (ref 0.1–1.3)
MONOCYTES NFR BLD AUTO: 6.8 % (ref 2–12)
MONOCYTES NFR BLD MANUAL: 7 % (ref 0–11)
NEUTROPHILS # BLD AUTO: 12.5 /CMM (ref 1.8–8.9)
NEUTROPHILS NFR BLD AUTO: 90 % (ref 43–81)
NEUTS BAND NFR BLD MANUAL: 5 % (ref 0–5)
NEUTS SEG NFR BLD MANUAL: 84 % (ref 42–76)
PHOSPHATE SERPL-MCNC: 2.5 MG/DL (ref 2.5–4.9)
PLATELET # BLD AUTO: 166 /CMM (ref 150–450)
POTASSIUM SERPL-SCNC: 3.7 MMOL/L (ref 3.5–5.1)
RBC # BLD AUTO: 4.87 MIL/UL (ref 4.5–6)
RDW COEFFICIENT OF VARIATION: 13.5 (ref 11.5–15)
SODIUM SERPL-SCNC: 138 MMOL/L (ref 136–145)
WBC NRBC COR # BLD AUTO: 13.9 K/UL (ref 4.3–11)

## 2018-09-05 RX ADMIN — Medication SCH MG: at 09:05

## 2018-09-05 RX ADMIN — PIPERACILLIN SODIUM AND TAZOBACTAM SODIUM SCH MLS/HR: .375; 3 INJECTION, POWDER, LYOPHILIZED, FOR SOLUTION INTRAVENOUS at 23:30

## 2018-09-05 RX ADMIN — SODIUM CHLORIDE SCH MG: 9 INJECTION, SOLUTION INTRAVENOUS at 09:04

## 2018-09-05 RX ADMIN — HYDROMORPHONE HYDROCHLORIDE PRN MG: 1 INJECTION, SOLUTION INTRAMUSCULAR; INTRAVENOUS; SUBCUTANEOUS at 23:30

## 2018-09-05 RX ADMIN — PIPERACILLIN SODIUM AND TAZOBACTAM SODIUM SCH MLS/HR: .375; 3 INJECTION, POWDER, LYOPHILIZED, FOR SOLUTION INTRAVENOUS at 05:54

## 2018-09-05 RX ADMIN — HYDROMORPHONE HYDROCHLORIDE PRN MG: 1 INJECTION, SOLUTION INTRAMUSCULAR; INTRAVENOUS; SUBCUTANEOUS at 13:04

## 2018-09-05 RX ADMIN — DEXTROSE MONOHYDRATE SCH MLS/HR: 50 INJECTION, SOLUTION INTRAVENOUS at 06:43

## 2018-09-05 RX ADMIN — HYDROMORPHONE HYDROCHLORIDE PRN MG: 1 INJECTION, SOLUTION INTRAMUSCULAR; INTRAVENOUS; SUBCUTANEOUS at 03:33

## 2018-09-05 RX ADMIN — HYDROMORPHONE HYDROCHLORIDE PRN MG: 1 INJECTION, SOLUTION INTRAMUSCULAR; INTRAVENOUS; SUBCUTANEOUS at 18:36

## 2018-09-05 RX ADMIN — PIPERACILLIN SODIUM AND TAZOBACTAM SODIUM SCH MLS/HR: .375; 3 INJECTION, POWDER, LYOPHILIZED, FOR SOLUTION INTRAVENOUS at 12:15

## 2018-09-05 RX ADMIN — DEXTROSE MONOHYDRATE PRN MG: 50 INJECTION, SOLUTION INTRAVENOUS at 18:36

## 2018-09-05 RX ADMIN — PIPERACILLIN SODIUM AND TAZOBACTAM SODIUM SCH MLS/HR: .375; 3 INJECTION, POWDER, LYOPHILIZED, FOR SOLUTION INTRAVENOUS at 17:39

## 2018-09-05 RX ADMIN — HYDROMORPHONE HYDROCHLORIDE PRN MG: 1 INJECTION, SOLUTION INTRAMUSCULAR; INTRAVENOUS; SUBCUTANEOUS at 09:07

## 2018-09-05 RX ADMIN — Medication SCH MG: at 17:39

## 2018-09-05 RX ADMIN — SODIUM CHLORIDE, SODIUM LACTATE, POTASSIUM CHLORIDE, AND CALCIUM CHLORIDE PRN MLS/HR: .6; .31; .03; .02 INJECTION, SOLUTION INTRAVENOUS at 09:35

## 2018-09-05 RX ADMIN — SODIUM CHLORIDE, SODIUM LACTATE, POTASSIUM CHLORIDE, AND CALCIUM CHLORIDE PRN MLS/HR: .6; .31; .03; .02 INJECTION, SOLUTION INTRAVENOUS at 20:13

## 2018-09-05 RX ADMIN — DEXTROSE MONOHYDRATE PRN MG: 50 INJECTION, SOLUTION INTRAVENOUS at 09:15

## 2018-09-05 NOTE — NUR
RN INITIAL NOTES



Received patient on bed, alert, oriented x 4. Family at bedside. Breathing even and 
unlabored. Not in any distress. On O2 3Lpm raoul NC. No complaints as of this time, just 
requesting to close the door everytime I leave the room. DAPHNEY drain in place, serosanguinous 
drainage noted. Surgical sites are intact, no signs of bleeding and infection. Peripheral IV 
infusing well. Patient stable as endorsed by the morning shift RN. Call bell within reach. 
Bed in low, locked position. Will monitor accordingly

## 2018-09-05 NOTE — NUR
RN NOTES



Patient reported 6/10 abdominal pain and requested pain medication. 1mg IV Dilaudid 
administered per orders for prn pain relief.

## 2018-09-05 NOTE — NUR
TELE/RN CLOSING NOTES



PT RESTING IN BED WITH EYES CLOSED. ON 2L O2 VIA NC, BREATHING EVEN AND UNLABORED. NO SOB 
NOTED. PAIN MANAGED THROUGHOUT SHIFT WITH PRN DILAUDID. PER PT, NORCO IS INEFFECTIVE. NO 
OUTPUT FROM COLOSTOMY NOTED. DAPHNEY DRAIN #1 WITH 50ML TOTAL OUTPUT. DAPHNEY DRAIN #2 WITH 150ML 
TOTAL OUTPUT. SURGICAL DRESSINGS C/D/I. NO BLEEDING NOTED. IV TO LFA PATENT AND INTACT 
RUNNING IVF AS ORDERED. KEPT PT AS COMFORTABLE AS POSSIBLE. ALL NEEDS MET. ENCOURAGED PT TO 
TURN/REPOSITION BUT PT REFUSED. EDUCATED REGARDING RISKS/BENEFITS OF TURNING IN BED, 
VERBALIZED UNDERSTANDING BUT STILL REFUSING. REFUSED LINEN CHANGE. OTHERWISE NO SIGNIFICANT 
CHANGES OVERNIGHT. BED REMAINS IN LOW/LOCKED POSITION WITH CALL LIGHT IN REACH AND BILATERAL 
UPPER RAILS  IN PLACE. WILL ENDORSE TO DAY SHIFT RN FANTA.

## 2018-09-05 NOTE — NUR
RN CLOSING NOTES



PT IS SITTING IN BED, AWAKE AND ALERT. PT IN O2 VIA NASAL CANNULA 3L, NO SIGNS OF LABORED 
BREATHING. IV ACCESS ON THE LEFT FOREARM 22G PATENT AND INTACT. SURGICAL INCISION ON THE 
UPPER ABDOMEN IS INTACT, NO SIGNS OF INFECTION. DAPHNEY DRAINAGE DRAINED WITH DAPHNEY #1 75ML, DAPHNEY #2 
270 ML, WITH SEROSANGENOUS DRAINAGE. COLOSTOMY INTACT, PATENT. PAIN MEDICATION ADMINSTERED 
PRN AS ORDERED. SAFETY MEASURES IN PLACE, CALL LIGHT WITHIN REACH. ENDORSED CONTINUITY OF 
CARE TO THE NEXT SHIFT NURSE

## 2018-09-05 NOTE — NUR
RN INITIAL NOTS



PT IN BED, RESTING COMFORTABLY. NO SIGNS OF LABORED BREATHING. BOTH DAPHNEY DRAINAGE SHOWS LIGHT 
RED ORANGE COLOR DRAINAGE. SURGICAL SITES ARE INTACT, NO SIGNS OF BLEEDING AND INFECTION. IV 
ACCESS ON THE LEFT FOREARM 22G IS PATENT AND INTACT. PT SHOWS NO SIGN OF PAIN OR 
UNCOMFORTABLENESS. BED ON LOW, CALL LIGHT WITHIN REACH. WILL CONTINUE TO MONITOR AND ASSESS.

## 2018-09-05 NOTE — NUR
RN NOTES/ COLOSTOMY TEACHING



PT COLOSTOMY WAS CHANGED AND DEMONSTRATED HOW TO PERFORM THE COLOSTOMY BAG CHANGE. PT SHOWS 
INTEREST, WATCHING THE COLOSTOMY BAG CHANGE. HE SAID THAT "BEFORE I LEAVE, I WILL TRY IT 
MYSELF." EDUCATIONAL INSTRUCTION ABOUT COLOSTOMY BAG CHANGE WAS GIVEN TO THE PATIENT.

## 2018-09-06 VITALS — DIASTOLIC BLOOD PRESSURE: 96 MMHG | SYSTOLIC BLOOD PRESSURE: 161 MMHG

## 2018-09-06 VITALS — SYSTOLIC BLOOD PRESSURE: 144 MMHG | DIASTOLIC BLOOD PRESSURE: 98 MMHG

## 2018-09-06 VITALS — SYSTOLIC BLOOD PRESSURE: 143 MMHG | DIASTOLIC BLOOD PRESSURE: 64 MMHG

## 2018-09-06 LAB
BUN SERPL-MCNC: 13 MG/DL (ref 7–18)
CALCIUM SERPL-MCNC: 8 MG/DL (ref 8.5–10.1)
CHLORIDE SERPL-SCNC: 103 MMOL/L (ref 98–107)
CO2 SERPL-SCNC: 30 MMOL/L (ref 21–32)
CREAT SERPL-MCNC: 0.8 MG/DL (ref 0.6–1.3)
GLUCOSE SERPL-MCNC: 120 MG/DL (ref 74–106)
MAGNESIUM SERPL-MCNC: 2 MG/DL (ref 1.8–2.4)
PHOSPHATE SERPL-MCNC: 3.3 MG/DL (ref 2.5–4.9)
POTASSIUM SERPL-SCNC: 3.8 MMOL/L (ref 3.5–5.1)
SODIUM SERPL-SCNC: 139 MMOL/L (ref 136–145)

## 2018-09-06 RX ADMIN — MAGNESIUM HYDROXIDE PRN ML: 400 SUSPENSION ORAL at 21:01

## 2018-09-06 RX ADMIN — HYDROMORPHONE HYDROCHLORIDE PRN MG: 1 INJECTION, SOLUTION INTRAMUSCULAR; INTRAVENOUS; SUBCUTANEOUS at 13:57

## 2018-09-06 RX ADMIN — HYDROMORPHONE HYDROCHLORIDE PRN MG: 1 INJECTION, SOLUTION INTRAMUSCULAR; INTRAVENOUS; SUBCUTANEOUS at 17:34

## 2018-09-06 RX ADMIN — Medication SCH MG: at 17:34

## 2018-09-06 RX ADMIN — PIPERACILLIN SODIUM AND TAZOBACTAM SODIUM SCH MLS/HR: .375; 3 INJECTION, POWDER, LYOPHILIZED, FOR SOLUTION INTRAVENOUS at 05:23

## 2018-09-06 RX ADMIN — SODIUM CHLORIDE SCH MG: 9 INJECTION, SOLUTION INTRAVENOUS at 08:47

## 2018-09-06 RX ADMIN — DEXTROSE MONOHYDRATE PRN MG: 50 INJECTION, SOLUTION INTRAVENOUS at 02:28

## 2018-09-06 RX ADMIN — HYDROMORPHONE HYDROCHLORIDE PRN MG: 1 INJECTION, SOLUTION INTRAMUSCULAR; INTRAVENOUS; SUBCUTANEOUS at 02:28

## 2018-09-06 RX ADMIN — SODIUM CHLORIDE, SODIUM LACTATE, POTASSIUM CHLORIDE, AND CALCIUM CHLORIDE PRN MLS/HR: .6; .31; .03; .02 INJECTION, SOLUTION INTRAVENOUS at 17:34

## 2018-09-06 RX ADMIN — PIPERACILLIN SODIUM AND TAZOBACTAM SODIUM SCH MLS/HR: .375; 3 INJECTION, POWDER, LYOPHILIZED, FOR SOLUTION INTRAVENOUS at 11:58

## 2018-09-06 RX ADMIN — SODIUM CHLORIDE, SODIUM LACTATE, POTASSIUM CHLORIDE, AND CALCIUM CHLORIDE PRN MLS/HR: .6; .31; .03; .02 INJECTION, SOLUTION INTRAVENOUS at 06:13

## 2018-09-06 RX ADMIN — HYDROMORPHONE HYDROCHLORIDE PRN MG: 1 INJECTION, SOLUTION INTRAMUSCULAR; INTRAVENOUS; SUBCUTANEOUS at 09:52

## 2018-09-06 RX ADMIN — Medication SCH MG: at 08:47

## 2018-09-06 RX ADMIN — HYDROMORPHONE HYDROCHLORIDE PRN MG: 1 INJECTION, SOLUTION INTRAMUSCULAR; INTRAVENOUS; SUBCUTANEOUS at 22:51

## 2018-09-06 RX ADMIN — DEXTROSE MONOHYDRATE PRN MG: 50 INJECTION, SOLUTION INTRAVENOUS at 13:57

## 2018-09-06 RX ADMIN — HYDROMORPHONE HYDROCHLORIDE PRN MG: 1 INJECTION, SOLUTION INTRAMUSCULAR; INTRAVENOUS; SUBCUTANEOUS at 05:31

## 2018-09-06 RX ADMIN — PIPERACILLIN SODIUM AND TAZOBACTAM SODIUM SCH MLS/HR: .375; 3 INJECTION, POWDER, LYOPHILIZED, FOR SOLUTION INTRAVENOUS at 17:34

## 2018-09-06 RX ADMIN — HYDROMORPHONE HYDROCHLORIDE PRN MG: 1 INJECTION, SOLUTION INTRAMUSCULAR; INTRAVENOUS; SUBCUTANEOUS at 19:43

## 2018-09-06 NOTE — NUR
RN CLOSING NOTES



Patient in bed, alert, oriented x 4. Breathing even and unlabored. Peripheral IV infusing at 
100mL/hr. Colostomy bag in place. Drained 100mL of serousanguinous fluid on DAPHNEY drain#1 and 
100mL of fluid in DAPHNEY drain #2. Patient able to make needs known. All needs attended to. All 
due medications given as ordered. Call bell within reach. Bed in low, locked position. Will 
endorse FANTA to oncoming RN.

## 2018-09-06 NOTE — NUR
RN CLOSING NOTES



PT IS AWAKE AND ALERT, SITTING IN BED. NO SIGNS OF LABORED BREATHING. IV ACCESS ON THE LEFT 
FOREARM 22G PATENT AND INTACT, D5 1/2 NS WITH 20 MEQ KCL RUNNING. COLOSTOMY BAG IS INTACT, 
NO BOWEL MOVEMENT. BOTH DAPHNEY DRAINAGE ARE DRAINING WITH SEROSANGUINEOUS DRAINAGE. DAPHNEY #1 75ML; 
DAPHNEY #2 150ML. PT COMPLAINS OF PAIN BUT TOLERABLE, RECEIVED PAIN RELIEF AT 1743. SAFETY 
MEASURES PLACED, CALL LIGHT WITHIN REACH. DENIES ANY PAIN AT THIS TIME. ENDORSED CONTINUITY 
OF CARE TO THE NEXT SHIFT NURSE.

## 2018-09-06 NOTE — NUR
RN NOTES



Patient reported 6/10 abdominal pain and requested Dilaudid for pain relief. Patient also 
complaining of nausea. 1mg IV Dilaudid was given along with 4mg IV Zofran per orders. Will 
continue to monitor.

## 2018-09-06 NOTE — NUR
RN INITIAL NOTES



PT AWAKE AND ALERT IN BED, SEMI-FOWLERS. NO SIGNS OF LABORED BREATHING. DENIES ANY PAIN. 
COLOSTOMY BAG IS PATENT AND INTACT. BOTH DAPHNEY DRAINAGE PATENT AND INTACT, DRAINING WITH 
SEROSANGENOUS FLUID. SURGICAL INCISION IN THE ABDOMEN ARE INTACT, OPEN TO AIR. NO SIGNS OF 
NEW INFECTION. IV ACCESS ON THE LEFT FOREARM 22G IS PATENT AND INTACT. SAFETY MEASURES 
PLACED, CALL LIGHT WITHIN REACH. WILL CONTINUE TO MONITOR

## 2018-09-06 NOTE — NUR
DILAUDID GIVEN AS ORDERED FOR C/O PAIN. NORCO OFFERED AND ENCOURAGED BUT PT STATED HE WILL 
TRY THAT NEXT TIME HE IS IN PLAIN. WILL CONT TO MONITOR ,

PT EDUCATION REGARDING EMPTING DAPHNEY BAG WAS GIVEN WITH UNDERSTANDING.

## 2018-09-06 NOTE — NUR
DR SUÁREZ ON THE FLOOR MADE AWARE OF THE NO BM AND EMPTY COLOSTOMY BAG. MD RECOMMENDING MOM 
AS ORDERED X ONE NOW AND ANOTHER DOSE IN 6 HRS. PT MADE AWARE AND THE FIRST DOSE WAS GIVEN.

## 2018-09-06 NOTE — NUR
PT WAS SEEN AND EXAMINED BY DR. SUÁREZ. PAIN AND BOWEL MANAGEMENT WERE EXPLAINED TO THE PT BY 
MD. MD SUGGESTING TO TAKE ANOTHER DOSE OF MOM IN AM AND A THIRD DOSE TWELVE HOURS AFTER. 
ALSO PER MD TO ORDER AN ABD/ PELVIC CT W/ IV CONTRAST IF  WBC INCREASES BY 2 POINTS IN AM.

## 2018-09-06 NOTE — NUR
RN NOTE;



RECEIVED PT IN BED W/ FAMILY AT THE BED SIDE. BREATHING EVENLY. NO SOB. NAD. W/ C/O ABD PAIN 
DILAUDID GIVEN AS ORDERED PER PT'S REQUEST. COLOSTOMY BAG IN PLACE EMPTY W/ NO BM OR GAS. DAPHNEY 
SITES IN PLACE DRAINING SERO-SANG FLUID. ON ONGOING IVF HYDRATION. IV SITE INTACT AND PATENT 
W/ NO S/S OF INFECTION OR INFILTRATION. PT WAS ENCOURAGED TO GET OOB. SIT ON  A CHAIR OR 
AMBULATE AS TOLERATED. ALSO PO INTAKE WAS ENCOURAGE AS DIET ORDER. 

NEEDS ATTENDED. ASSISTED W/ ADLS .CALL LIGHT WITHIN REACH, WILL CONT TO MONITOR.

## 2018-09-07 VITALS — DIASTOLIC BLOOD PRESSURE: 97 MMHG | SYSTOLIC BLOOD PRESSURE: 152 MMHG

## 2018-09-07 VITALS — SYSTOLIC BLOOD PRESSURE: 154 MMHG | DIASTOLIC BLOOD PRESSURE: 104 MMHG

## 2018-09-07 VITALS — SYSTOLIC BLOOD PRESSURE: 147 MMHG | DIASTOLIC BLOOD PRESSURE: 94 MMHG

## 2018-09-07 LAB
BASOPHILS # BLD AUTO: 0 /CMM (ref 0–0.2)
BASOPHILS NFR BLD AUTO: 0 % (ref 0–2)
BUN SERPL-MCNC: 12 MG/DL (ref 7–18)
CALCIUM SERPL-MCNC: 7.9 MG/DL (ref 8.5–10.1)
CHLORIDE SERPL-SCNC: 102 MMOL/L (ref 98–107)
CO2 SERPL-SCNC: 28 MMOL/L (ref 21–32)
CREAT SERPL-MCNC: 0.7 MG/DL (ref 0.6–1.3)
EOSINOPHIL NFR BLD AUTO: 1.8 % (ref 0–6)
GLUCOSE SERPL-MCNC: 113 MG/DL (ref 74–106)
HCT VFR BLD AUTO: 43 % (ref 39–51)
HGB BLD-MCNC: 13.9 G/DL (ref 13.5–17.5)
LYMPHOCYTES NFR BLD AUTO: 1 /CMM (ref 0.8–4.8)
LYMPHOCYTES NFR BLD AUTO: 9.3 % (ref 20–44)
MCHC RBC AUTO-ENTMCNC: 33 G/DL (ref 31–36)
MCV RBC AUTO: 90 FL (ref 80–96)
MONOCYTES NFR BLD AUTO: 1.5 /CMM (ref 0.1–1.3)
MONOCYTES NFR BLD AUTO: 14.7 % (ref 2–12)
NEUTROPHILS # BLD AUTO: 7.7 /CMM (ref 1.8–8.9)
NEUTROPHILS NFR BLD AUTO: 74.2 % (ref 43–81)
PLATELET # BLD AUTO: 217 /CMM (ref 150–450)
POTASSIUM SERPL-SCNC: 3.8 MMOL/L (ref 3.5–5.1)
RBC # BLD AUTO: 4.75 MIL/UL (ref 4.5–6)
RDW COEFFICIENT OF VARIATION: 13.5 (ref 11.5–15)
SODIUM SERPL-SCNC: 135 MMOL/L (ref 136–145)
WBC NRBC COR # BLD AUTO: 10.4 K/UL (ref 4.3–11)

## 2018-09-07 RX ADMIN — PIPERACILLIN SODIUM AND TAZOBACTAM SODIUM SCH MLS/HR: .375; 3 INJECTION, POWDER, LYOPHILIZED, FOR SOLUTION INTRAVENOUS at 00:26

## 2018-09-07 RX ADMIN — Medication SCH MG: at 16:10

## 2018-09-07 RX ADMIN — HYDROMORPHONE HYDROCHLORIDE PRN MG: 1 INJECTION, SOLUTION INTRAMUSCULAR; INTRAVENOUS; SUBCUTANEOUS at 04:49

## 2018-09-07 RX ADMIN — SODIUM CHLORIDE, SODIUM LACTATE, POTASSIUM CHLORIDE, AND CALCIUM CHLORIDE PRN MLS/HR: .6; .31; .03; .02 INJECTION, SOLUTION INTRAVENOUS at 16:10

## 2018-09-07 RX ADMIN — PIPERACILLIN SODIUM AND TAZOBACTAM SODIUM SCH MLS/HR: .375; 3 INJECTION, POWDER, LYOPHILIZED, FOR SOLUTION INTRAVENOUS at 23:52

## 2018-09-07 RX ADMIN — Medication PRN EACH: at 09:26

## 2018-09-07 RX ADMIN — PIPERACILLIN SODIUM AND TAZOBACTAM SODIUM SCH MLS/HR: .375; 3 INJECTION, POWDER, LYOPHILIZED, FOR SOLUTION INTRAVENOUS at 06:18

## 2018-09-07 RX ADMIN — SODIUM CHLORIDE SCH MG: 9 INJECTION, SOLUTION INTRAVENOUS at 08:21

## 2018-09-07 RX ADMIN — HYDROMORPHONE HYDROCHLORIDE PRN MG: 1 INJECTION, SOLUTION INTRAMUSCULAR; INTRAVENOUS; SUBCUTANEOUS at 02:13

## 2018-09-07 RX ADMIN — Medication PRN EACH: at 21:45

## 2018-09-07 RX ADMIN — Medication PRN EACH: at 00:55

## 2018-09-07 RX ADMIN — MAGNESIUM HYDROXIDE PRN ML: 400 SUSPENSION ORAL at 04:15

## 2018-09-07 RX ADMIN — SODIUM CHLORIDE, SODIUM LACTATE, POTASSIUM CHLORIDE, AND CALCIUM CHLORIDE PRN MLS/HR: .6; .31; .03; .02 INJECTION, SOLUTION INTRAVENOUS at 04:29

## 2018-09-07 RX ADMIN — PIPERACILLIN SODIUM AND TAZOBACTAM SODIUM SCH MLS/HR: .375; 3 INJECTION, POWDER, LYOPHILIZED, FOR SOLUTION INTRAVENOUS at 17:05

## 2018-09-07 RX ADMIN — Medication SCH MG: at 08:21

## 2018-09-07 RX ADMIN — HYDROMORPHONE HYDROCHLORIDE PRN MG: 1 INJECTION, SOLUTION INTRAMUSCULAR; INTRAVENOUS; SUBCUTANEOUS at 13:14

## 2018-09-07 RX ADMIN — Medication PRN EACH: at 16:10

## 2018-09-07 RX ADMIN — DEXTROSE MONOHYDRATE PRN MG: 50 INJECTION, SOLUTION INTRAVENOUS at 13:17

## 2018-09-07 RX ADMIN — PIPERACILLIN SODIUM AND TAZOBACTAM SODIUM SCH MLS/HR: .375; 3 INJECTION, POWDER, LYOPHILIZED, FOR SOLUTION INTRAVENOUS at 11:08

## 2018-09-07 NOTE — NUR
RN OPENING NOTES



RECEIVED PT IN BED, ALERT AND ORIENTED X 4, NO SOB NOTED, IN NO ACUTE DISTRESS, ALL 
PATIENT'S NEEDS ATTENDED TO AT THIS TIME. PT WITH GOOD SAFETY AWARENESS, BED IN LOW POSITION 
AND LOCKED IN PLACE. PLACED CALL LIGHT WITHIN EASY REACH. PT CONTINUES TO RECEIVE IVF AS 
ORDERED, INFUSING WELL. WILL CONTINUE TO MONITOR.

## 2018-09-07 NOTE — NUR
PT IN BED AWAKE AND ALERT. BREATHING EVENLY. NO SOB. NO ACUTE EVENT DURING THE NIGHT. PT WAS 
STRONGLY ENCOURAGED TO AMBULATE AND BE ACTIVE. ASSISTED PT TO THE BATHROOM AND ORAL CARE . 
ALSO ENCOURAGE PT TO USE THE INCENTIVE  SPIROMETER, ON ONGOING IVF HYDRATION. IV SITE INTACT 
AND PATENT. NO C/O PAIN OR DISCOMFORT AT THIS TIME. NEEDS ATTENDED . CALL LIGHT WITHIN 
REACH. WILL CONT TO MONITOR AND WILL ENDORSE TO AM SHIFT FOR FANTA.

## 2018-09-07 NOTE — NUR
MS RN OPENING NOTES



RECEIVED PT LAYING IN BED WITH HOB ELEVATED. PT IS AWAKE, ALERT AND RESPONSIVE. RESPIRATIONS 
ARE EVEN AND UNLABORED, NOT IN ANY ACUTE DISTRESS NOTED. PT C/O PAIN 4/10 BUT IS TOLERABLE. 
NO C/O SOB, N/V. IV ACCESS TO LFA G22, NO FILTRATION NOTED. DRESSING KEPT CLEAN AND DRY. IV 
FLUIDS INFUSING AT 100ML/HR. SAFETY MEASURES ARE IN PLACE. BED IS AT ITS LOWEST AND LOCKED 
POSITION. CALL LIGHT IS LEFT WITHIN REACH. ENCOURAGED PT TO USE INCENTIVE SPIROMETER AND 
ABLE TO PERFORM RETURN DEMONSTRATION. WILL CONTINUE TO MONITOR THROUGHOUT SHIFT FOR 
CONTINUITY OF CARE.

## 2018-09-07 NOTE — NUR
MOM GIVEN PER DOCTOR JOSE ARMANDO'S RECOMMENDATION AND PT'S REQUEST. PER MD TO REPEAT THE MOM DOSE 
IN 12 HOURS. WILL PL[address] THE ORDER.

## 2018-09-07 NOTE — NUR
MS RN CLOSING NOTES



ALL DUE MEDS GIVEN, NEEDS MET AND RENDERED. PT IS A/O X4, AFEBRILE. RESPIRATIONS ARE EVEN 
AND UNLABORED, NOT IN ANY ACUTE DISTRESS NOTED. PT'S PAIN IS TOLERABLE AT THIS TIME. NO C/O 
SOB, N/V. IV SITE INTACT, NO INFILTRATION NOTED. DRESSING KEPT CLEAN AND DRY. DAPHNEY DRAINS 
INTACT AND DRAINING WELL. SMALL AMT OF BM NOTED IN COLOSTOMY BAG. SAFETY MEASURES ARE IN 
PLACE. CALL LIGHT IS LEFT WITHIN REACH. WILL ENDORSE TO NEXT SHIFT FOR CONTINUITY OF CARE.

## 2018-09-08 VITALS — DIASTOLIC BLOOD PRESSURE: 98 MMHG | SYSTOLIC BLOOD PRESSURE: 147 MMHG

## 2018-09-08 VITALS — DIASTOLIC BLOOD PRESSURE: 90 MMHG | SYSTOLIC BLOOD PRESSURE: 150 MMHG

## 2018-09-08 VITALS — SYSTOLIC BLOOD PRESSURE: 149 MMHG | DIASTOLIC BLOOD PRESSURE: 98 MMHG

## 2018-09-08 LAB
BASOPHILS # BLD AUTO: 0.1 /CMM (ref 0–0.2)
BASOPHILS NFR BLD AUTO: 0.6 % (ref 0–2)
BUN SERPL-MCNC: 13 MG/DL (ref 7–18)
CALCIUM SERPL-MCNC: 7.8 MG/DL (ref 8.5–10.1)
CHLORIDE SERPL-SCNC: 102 MMOL/L (ref 98–107)
CO2 SERPL-SCNC: 27 MMOL/L (ref 21–32)
CREAT SERPL-MCNC: 0.8 MG/DL (ref 0.6–1.3)
EOSINOPHIL NFR BLD AUTO: 1.4 % (ref 0–6)
GLUCOSE SERPL-MCNC: 114 MG/DL (ref 74–106)
HCT VFR BLD AUTO: 43 % (ref 39–51)
HGB BLD-MCNC: 13.6 G/DL (ref 13.5–17.5)
LYMPHOCYTES NFR BLD AUTO: 1.2 /CMM (ref 0.8–4.8)
LYMPHOCYTES NFR BLD AUTO: 9.4 % (ref 20–44)
MAGNESIUM SERPL-MCNC: 2.2 MG/DL (ref 1.8–2.4)
MCHC RBC AUTO-ENTMCNC: 32 G/DL (ref 31–36)
MCV RBC AUTO: 91 FL (ref 80–96)
MONOCYTES NFR BLD AUTO: 1.3 /CMM (ref 0.1–1.3)
MONOCYTES NFR BLD AUTO: 10.6 % (ref 2–12)
NEUTROPHILS # BLD AUTO: 9.6 /CMM (ref 1.8–8.9)
NEUTROPHILS NFR BLD AUTO: 78 % (ref 43–81)
PHOSPHATE SERPL-MCNC: 3.4 MG/DL (ref 2.5–4.9)
PLATELET # BLD AUTO: 298 /CMM (ref 150–450)
POTASSIUM SERPL-SCNC: 3.9 MMOL/L (ref 3.5–5.1)
RBC # BLD AUTO: 4.73 MIL/UL (ref 4.5–6)
RDW COEFFICIENT OF VARIATION: 13.4 (ref 11.5–15)
SODIUM SERPL-SCNC: 137 MMOL/L (ref 136–145)
WBC NRBC COR # BLD AUTO: 12.3 K/UL (ref 4.3–11)

## 2018-09-08 RX ADMIN — PIPERACILLIN SODIUM AND TAZOBACTAM SODIUM SCH MLS/HR: .375; 3 INJECTION, POWDER, LYOPHILIZED, FOR SOLUTION INTRAVENOUS at 11:27

## 2018-09-08 RX ADMIN — CYCLOBENZAPRINE HYDROCHLORIDE PRN MG: 10 TABLET, FILM COATED ORAL at 19:11

## 2018-09-08 RX ADMIN — Medication SCH MG: at 15:00

## 2018-09-08 RX ADMIN — PIPERACILLIN SODIUM AND TAZOBACTAM SODIUM SCH MLS/HR: .375; 3 INJECTION, POWDER, LYOPHILIZED, FOR SOLUTION INTRAVENOUS at 23:54

## 2018-09-08 RX ADMIN — KETOROLAC TROMETHAMINE PRN MG: 30 INJECTION, SOLUTION INTRAMUSCULAR at 21:51

## 2018-09-08 RX ADMIN — PIPERACILLIN SODIUM AND TAZOBACTAM SODIUM SCH MLS/HR: .375; 3 INJECTION, POWDER, LYOPHILIZED, FOR SOLUTION INTRAVENOUS at 05:38

## 2018-09-08 RX ADMIN — Medication PRN EACH: at 02:05

## 2018-09-08 RX ADMIN — HYDROMORPHONE HYDROCHLORIDE PRN MG: 1 INJECTION, SOLUTION INTRAMUSCULAR; INTRAVENOUS; SUBCUTANEOUS at 05:53

## 2018-09-08 RX ADMIN — PIPERACILLIN SODIUM AND TAZOBACTAM SODIUM SCH MLS/HR: .375; 3 INJECTION, POWDER, LYOPHILIZED, FOR SOLUTION INTRAVENOUS at 17:22

## 2018-09-08 RX ADMIN — Medication SCH MG: at 08:27

## 2018-09-08 RX ADMIN — SODIUM CHLORIDE, SODIUM LACTATE, POTASSIUM CHLORIDE, AND CALCIUM CHLORIDE PRN MLS/HR: .6; .31; .03; .02 INJECTION, SOLUTION INTRAVENOUS at 04:28

## 2018-09-08 RX ADMIN — HYDROCODONE BITARTRATE AND ACETAMINOPHEN PRN EA: 10; 325 TABLET ORAL at 19:11

## 2018-09-08 RX ADMIN — Medication SCH MG: at 17:22

## 2018-09-08 RX ADMIN — HYDROMORPHONE HYDROCHLORIDE PRN MG: 1 INJECTION, SOLUTION INTRAMUSCULAR; INTRAVENOUS; SUBCUTANEOUS at 03:16

## 2018-09-08 RX ADMIN — Medication PRN EACH: at 09:57

## 2018-09-08 RX ADMIN — HYDROMORPHONE HYDROCHLORIDE PRN MG: 1 INJECTION, SOLUTION INTRAMUSCULAR; INTRAVENOUS; SUBCUTANEOUS at 13:35

## 2018-09-08 RX ADMIN — SODIUM CHLORIDE SCH MG: 9 INJECTION, SOLUTION INTRAVENOUS at 08:27

## 2018-09-08 NOTE — NUR
RN OPENING NOTES



RECEIVED PATIENT IN BED, NOTED WITH FACIAL GRIMACING, PT VERBALIZED THAT HE NEEDS PAIN 
MEDICATION AND HAS SEVERE LEVEL OF PAIN AND IS FEELING THE SPASMS ON HIS LOWER BACKSIDES. 
ALL DUE MEDICATION GIVEN, EDUCATED PT ON TURNING AND REPOSITIONING IN BED TO HELP IN 
ALLEVIATING PAIN. ALL PATIENT'S NEEDS ATTENDED TO AT THIS TIME. PLACED BED IN LOW POSITION 
AND LOCKED IN PLACE. PLACED CALL LIGHT WITHIN EASY REACH. WILL CONTINUE TO MONITOR.

## 2018-09-08 NOTE — NUR
RN CLOSING NOTES



PATIENT IN BED, AWAKE, ALERT AND ORIENTED. ALL PATIENT'S NEEDS ATTENDED TO THROUGHOUT THE 
SHIFT. PT ABLE TO AMBULATE WITH FWW WITH STANDBY ASSIST AND ABLE TO SIT UP FOR A LONG TIME. 
ALL DUE MEDICATIONS GIVEN AS NEEDED. IVF INFUSING WELL AS ORDERED. PT IN NO ACUTE DISTRESS 
AT THIS TIME. BED IN LOW POSITION AND LOCKED IN PLACE. CALL LIGHT WITHIN EASY REACH. DAPHNEY 
DRAINS WITH SEROUS OUTPUT. COLOSTOMY BAG WITH APPROXIMATELY 80 CC OUTPUT. WILL ENDORSE TO AM 
SHIFT NURSE FOR CONTINUITY OF CARE.

## 2018-09-08 NOTE — NUR
MS RN OPENING NOTES



RECEIVED PT LAYING IN BED WITH HOB ELEVATED. PT IS AWAKE, ALERT AND RESPONSIVE. RESPIRATIONS 
ARE EVEN AND UNLABORED, NOT IN ANY ACUTE DISTRESS NOTED. PT C/O PAIN 6/10 BUT DOES NOT WANT 
PAIN MEDICATION AT THIS TIME. NO C/O SOB, N/V. IV ACCESS TO LFA G22, NO FILTRATION NOTED. 
DRESSING KEPT CLEAN AND DRY. IV FLUIDS INFUSING AT 100ML/HR. SAFETY MEASURES ARE IN PLACE. 
BED IS AT ITS LOWEST AND LOCKED POSITION. CALL LIGHT IS LEFT WITHIN REACH. ENCOURAGED PT TO 
USE INCENTIVE SPIROMETER AND ABLE TO PERFORM RETURN DEMONSTRATION. WILL CONTINUE TO MONITOR 
THROUGHOUT SHIFT FOR CONTINUITY OF CARE.

## 2018-09-08 NOTE — NUR
MS RN NOTES



PT STATED TORADOL IS EFFECTIVE WITH THE PAIN. PT DID NOT WANT TO TAKE OXYCONTIN AND 
FLEXERIL. WILL CONTINUE TO MONITOR.

## 2018-09-08 NOTE — NUR
RN NOTES



PT SEEN AND EXAMINED BY DR LOW. DAPHNEY DRAINS REMOVED, NO BLEEDING, PT TOLERATED 
PROCEDURE WELL. MD WITH NEW ORDERS: FOR DAPHNEY SITE DRESSING TO CLEANSE WITH NS, PAT DRY AND 
COVER WITH DRY DRESSING DAILY AND ANOTHER ORDER FOR TORADOL 15MG IV Q6HRS PRN FOR PAIN X 48 
HOURS. ALL ORDERS NOTED AND CARRIED OUT.

## 2018-09-08 NOTE — NUR
MS RN CLOSING NOTES



ALL DUE MEDS GIVEN, NEEDS MET AND RENDERED. PT IS A/O X4, AFEBRILE. RESPIRATIONS ARE EVEN 
AND UNLABORED, NOT IN ANY ACUTE DISTRESS NOTED. PT'S PAIN IS TOLERABLE AT THIS TIME. NO C/O 
SOB, N/V. IV SITE INTACT, NO INFILTRATION NOTED. DRESSING KEPT CLEAN AND DRY. DAPHNEY DRAINS 
INTACT AND DRAINING WELL WITH 100CC. SMALL AMT OF BM NOTED IN COLOSTOMY BAG. CHANGED 
COLOSTOMY BAG X1 AND EDUCATED PT ON HOW TO CLEANSE STOMA AND PERISTOMA AND HOW TO PROPERLY 
APPLY COLOSTOMY BAG.  SAFETY MEASURES ARE IN PLACE. CALL LIGHT IS LEFT WITHIN REACH. WILL 
ENDORSE TO NEXT SHIFT FOR CONTINUITY OF CARE.

## 2018-09-09 VITALS — SYSTOLIC BLOOD PRESSURE: 186 MMHG | DIASTOLIC BLOOD PRESSURE: 85 MMHG

## 2018-09-09 VITALS — SYSTOLIC BLOOD PRESSURE: 133 MMHG | DIASTOLIC BLOOD PRESSURE: 79 MMHG

## 2018-09-09 LAB
BASOPHILS # BLD AUTO: 0 /CMM (ref 0–0.2)
BASOPHILS NFR BLD AUTO: 0 % (ref 0–2)
BUN SERPL-MCNC: 15 MG/DL (ref 7–18)
CALCIUM SERPL-MCNC: 8.1 MG/DL (ref 8.5–10.1)
CHLORIDE SERPL-SCNC: 103 MMOL/L (ref 98–107)
CO2 SERPL-SCNC: 28 MMOL/L (ref 21–32)
CREAT SERPL-MCNC: 0.8 MG/DL (ref 0.6–1.3)
EOSINOPHIL NFR BLD AUTO: 2.4 % (ref 0–6)
GLUCOSE SERPL-MCNC: 93 MG/DL (ref 74–106)
HCT VFR BLD AUTO: 40 % (ref 39–51)
HGB BLD-MCNC: 13.1 G/DL (ref 13.5–17.5)
LYMPHOCYTES NFR BLD AUTO: 1.4 /CMM (ref 0.8–4.8)
LYMPHOCYTES NFR BLD AUTO: 11.3 % (ref 20–44)
MAGNESIUM SERPL-MCNC: 2.4 MG/DL (ref 1.8–2.4)
MCHC RBC AUTO-ENTMCNC: 33 G/DL (ref 31–36)
MCV RBC AUTO: 91 FL (ref 80–96)
MONOCYTES NFR BLD AUTO: 1.1 /CMM (ref 0.1–1.3)
MONOCYTES NFR BLD AUTO: 8.6 % (ref 2–12)
NEUTROPHILS # BLD AUTO: 9.8 /CMM (ref 1.8–8.9)
NEUTROPHILS NFR BLD AUTO: 77.7 % (ref 43–81)
PHOSPHATE SERPL-MCNC: 3.3 MG/DL (ref 2.5–4.9)
PLATELET # BLD AUTO: 345 /CMM (ref 150–450)
POTASSIUM SERPL-SCNC: 4.5 MMOL/L (ref 3.5–5.1)
PSA FREE SERPL-MCNC: 0.46 NG/ML (ref 0–45)
PSA SERPL-MCNC: 2.7 NG/ML (ref 0–4)
RBC # BLD AUTO: 4.39 MIL/UL (ref 4.5–6)
RDW COEFFICIENT OF VARIATION: 13.3 (ref 11.5–15)
SODIUM SERPL-SCNC: 138 MMOL/L (ref 136–145)
WBC NRBC COR # BLD AUTO: 12.6 K/UL (ref 4.3–11)

## 2018-09-09 RX ADMIN — HYDROCODONE BITARTRATE AND ACETAMINOPHEN PRN EA: 10; 325 TABLET ORAL at 17:33

## 2018-09-09 RX ADMIN — MAGNESIUM HYDROXIDE PRN ML: 400 SUSPENSION ORAL at 07:00

## 2018-09-09 RX ADMIN — ALBUTEROL SULFATE SCH MG: 2.5 SOLUTION RESPIRATORY (INHALATION) at 19:25

## 2018-09-09 RX ADMIN — KETOROLAC TROMETHAMINE PRN MG: 30 INJECTION, SOLUTION INTRAMUSCULAR at 12:50

## 2018-09-09 RX ADMIN — PANTOPRAZOLE SODIUM SCH MG: 40 TABLET, DELAYED RELEASE ORAL at 07:00

## 2018-09-09 RX ADMIN — Medication SCH MG: at 15:44

## 2018-09-09 RX ADMIN — KETOROLAC TROMETHAMINE PRN MG: 30 INJECTION, SOLUTION INTRAMUSCULAR at 18:51

## 2018-09-09 RX ADMIN — ALBUTEROL SULFATE SCH MG: 2.5 SOLUTION RESPIRATORY (INHALATION) at 15:35

## 2018-09-09 RX ADMIN — CYCLOBENZAPRINE HYDROCHLORIDE PRN MG: 10 TABLET, FILM COATED ORAL at 10:59

## 2018-09-09 RX ADMIN — Medication SCH MG: at 15:00

## 2018-09-09 RX ADMIN — Medication SCH MG: at 16:24

## 2018-09-09 RX ADMIN — PIPERACILLIN SODIUM AND TAZOBACTAM SODIUM SCH MLS/HR: .375; 3 INJECTION, POWDER, LYOPHILIZED, FOR SOLUTION INTRAVENOUS at 12:43

## 2018-09-09 RX ADMIN — PIPERACILLIN SODIUM AND TAZOBACTAM SODIUM SCH MLS/HR: .375; 3 INJECTION, POWDER, LYOPHILIZED, FOR SOLUTION INTRAVENOUS at 05:12

## 2018-09-09 RX ADMIN — ALBUTEROL SULFATE SCH MG: 2.5 SOLUTION RESPIRATORY (INHALATION) at 23:11

## 2018-09-09 RX ADMIN — KETOROLAC TROMETHAMINE PRN MG: 30 INJECTION, SOLUTION INTRAMUSCULAR at 04:56

## 2018-09-09 RX ADMIN — PIPERACILLIN SODIUM AND TAZOBACTAM SODIUM SCH MLS/HR: .375; 3 INJECTION, POWDER, LYOPHILIZED, FOR SOLUTION INTRAVENOUS at 17:57

## 2018-09-09 RX ADMIN — Medication SCH MG: at 08:51

## 2018-09-09 RX ADMIN — PIPERACILLIN SODIUM AND TAZOBACTAM SODIUM SCH MLS/HR: .375; 3 INJECTION, POWDER, LYOPHILIZED, FOR SOLUTION INTRAVENOUS at 23:49

## 2018-09-09 RX ADMIN — Medication SCH MG: at 15:35

## 2018-09-09 RX ADMIN — Medication SCH MG: at 03:00

## 2018-09-09 RX ADMIN — HYDROCODONE BITARTRATE AND ACETAMINOPHEN PRN EA: 10; 325 TABLET ORAL at 01:47

## 2018-09-09 RX ADMIN — HYDROCODONE BITARTRATE AND ACETAMINOPHEN PRN EA: 10; 325 TABLET ORAL at 08:55

## 2018-09-09 RX ADMIN — Medication SCH MG: at 23:11

## 2018-09-09 RX ADMIN — Medication SCH MG: at 19:25

## 2018-09-09 NOTE — NUR
MS/RN   CLOSING NOTE



PATIENT ALERT AND ORIENTED X4. DENIES SOB. RESPIRATION REGULAR AND UNLABORED. PATIENT IS IN 
ROOM AIR AND SATURATION IS AT 97%. PATIENT COMPLAINS OF ABDOMINAL INCISION PAIN 4/10. 
PATIENT WILL BE GIVEN PRN PAIN MEDICATION. RAC G 22 PATENT AND SALINE LOCKED. COLOSTOMY 
STOMA SITE PINK AND MOIST. NOTED WITH SOFT AND BROWN STOOL IN THE COLOSTOMY BAG. DAPHNEY DRAIN 
SITES (2 SITES) WITH NO S/S INFECTION AND BOTH ARE COVERED WITH DRY DRESSING. THE LOWER DAPHNEY 
SITE OOZING SMALL AMOUNT OF SEROSANGUINEOUS FLUID. THE DRESSING IS CHANGED AS NEEDED. 
PATIENT IS ENCOURAGED TO AMBULATE AND USE INCENTIVE SPIROMETER, VERBAL CUES ARE GIVEN TO 
KEEP SAFETY AWARENESS HIGH. THE PATIENT VERBALIZED UNDERSTANDING. BED LOW AND LOCKED. SIDE 
RAILS UP X 3. CALL LIGHT WITHIN REACH. WILL ENDORSE TO NIGHT SHIFT.

## 2018-09-09 NOTE — NUR
RN OPENING NOTES



RECEIVED PT AMBULATING IN THE ROOM, IN NO ACUTE DISTRESS, NO SOB NOTED, BREATHING EVEN AND 
UNLABORED. PT DENIES PAIN AT THIS TIME. ALL PATIENT'S NEEDS ATTENDED TO AT THIS TIME. IV 
PERIPHERAL LINE ON SALINE LOCK, INTACT AND PATENT. WILL CONTINUE TO MONITOR.

## 2018-09-09 NOTE — NUR
MS/RN  OPENING NOTE



PATIENT IS RECEIVED SITTING IN A C/CHAIR IN HIS ROOM. IN ROOM AIR AND DENIES SOB. 
RESPIRATION REGULAR AND UNLABORED. DENIES PAIN AT THIS TIME. RAC G 22 PATENT AND SALINE 
LOCKED. DAPHNEY DRAIN SITES DRESSINGS INTACT AND CLEAN. COLOSTOMY STOMA PINK AND MOIST. PATIENT 
IS ENCOURAGED TO AMBULATE AND USE INCENTIVE SPIROMETER. THE PATIENT VERBALIZED 
UNDERSTANDING. VERBAL CUES ARE PROVIDED TO KEEP SAFETY AWARENESS HIGH. BED LOW AND LOCKED. 
SIDE RAILS UP X2. CALL LIGHT WITHIN REACH. WILL CONTINUE TO MONITOR.

## 2018-09-09 NOTE — NUR
RN NOTES



OFFERED OXYCONTIN TO PATIENT AND PATIENT REFUSED. AS PER PATIENT HE DOESN'T NEED PAIN 
MEDICATION AT THIS TIME AND WOULD LIKE TO STAY AWAY FROM THIS MEDICATION AS MUCH AS 
POSSIBLE. EXPLAINED RISKS AND BENEFITS TO PATIENT BUT PT CONTINUES TO REFUSE, RESPECTED PT'S 
DECISION. WILL CONTINUE TO MONITOR.

## 2018-09-09 NOTE — NUR
RN CLOSING NOTES



PATIENT WALKING AROUND ROOM, AWAKE AND ORIENTED, VERBALLY RESPONSIVE. PT WITH NO SOB, 
BREATHING EVEN AND UNLABORED IN ROOM AIR. ENCOURAGED PT TO PARTICIPATE IN ADLS AND AMBULATE 
MORE OFTEN AND USE INCENTIVE SPIROMETER AS MUCH AS POSSIBLE, PT VERBALIZED UNDERSTANDING. 
ALL PATIENT'S NEEDS ATTENDED TO, IN NO ACUTE DISTRESS. WILL ENDORSE TO AM SHIFT NURSE FOR 
CONTINUITY OF CARE.

## 2018-09-09 NOTE — NUR
MS/RN  NOTE



PATIENT NOTED TOLERATING FULL LIQUID DIET WELL. PAGED DR SUÁREZ AND RECEIVED NEW ORDER FOR 
PUREED DIET. THE ORDER IS READ BACK, VERIFIED. NOTED AND CARRIED OUT

## 2018-09-10 VITALS — SYSTOLIC BLOOD PRESSURE: 160 MMHG | DIASTOLIC BLOOD PRESSURE: 91 MMHG

## 2018-09-10 VITALS — SYSTOLIC BLOOD PRESSURE: 143 MMHG | DIASTOLIC BLOOD PRESSURE: 90 MMHG

## 2018-09-10 VITALS — DIASTOLIC BLOOD PRESSURE: 96 MMHG | SYSTOLIC BLOOD PRESSURE: 148 MMHG

## 2018-09-10 LAB
APPEARANCE UR: CLEAR
BASOPHILS # BLD AUTO: 0.1 /CMM (ref 0–0.2)
BASOPHILS NFR BLD AUTO: 0.4 % (ref 0–2)
BILIRUB UR QL STRIP: NEGATIVE
BUN SERPL-MCNC: 14 MG/DL (ref 7–18)
CALCIUM SERPL-MCNC: 7.8 MG/DL (ref 8.5–10.1)
CHLORIDE SERPL-SCNC: 100 MMOL/L (ref 98–107)
CO2 SERPL-SCNC: 24 MMOL/L (ref 21–32)
COLOR UR: (no result)
CREAT SERPL-MCNC: 0.9 MG/DL (ref 0.6–1.3)
EOSINOPHIL NFR BLD AUTO: 0.6 % (ref 0–6)
GLUCOSE SERPL-MCNC: 94 MG/DL (ref 74–106)
GLUCOSE UR STRIP-MCNC: NEGATIVE MG/DL
HCT VFR BLD AUTO: 39 % (ref 39–51)
HGB BLD-MCNC: 13.2 G/DL (ref 13.5–17.5)
HGB UR QL STRIP: NEGATIVE ERY/UL
KETONES UR STRIP-MCNC: (no result) MG/DL
LEUKOCYTE ESTERASE UR QL STRIP: NEGATIVE
LYMPHOCYTES NFR BLD AUTO: 1.1 /CMM (ref 0.8–4.8)
LYMPHOCYTES NFR BLD AUTO: 6.6 % (ref 20–44)
MAGNESIUM SERPL-MCNC: 2.1 MG/DL (ref 1.8–2.4)
MCHC RBC AUTO-ENTMCNC: 34 G/DL (ref 31–36)
MCV RBC AUTO: 90 FL (ref 80–96)
MONOCYTES NFR BLD AUTO: 1 /CMM (ref 0.1–1.3)
MONOCYTES NFR BLD AUTO: 5.9 % (ref 2–12)
NEUTROPHILS # BLD AUTO: 15 /CMM (ref 1.8–8.9)
NEUTROPHILS NFR BLD AUTO: 86.5 % (ref 43–81)
NITRITE UR QL STRIP: NEGATIVE
PH UR STRIP: 6 [PH] (ref 5–8)
PHOSPHATE SERPL-MCNC: 3.6 MG/DL (ref 2.5–4.9)
PLATELET # BLD AUTO: 222 /CMM (ref 150–450)
POTASSIUM SERPL-SCNC: 5 MMOL/L (ref 3.5–5.1)
PROT UR QL STRIP: NEGATIVE MG/DL
RBC # BLD AUTO: 4.34 MIL/UL (ref 4.5–6)
RBC #/AREA URNS HPF: (no result) /HPF (ref 0–2)
RDW COEFFICIENT OF VARIATION: 13.4 (ref 11.5–15)
SODIUM SERPL-SCNC: 133 MMOL/L (ref 136–145)
UROBILINOGEN UR STRIP-MCNC: 0.2 EU/DL
WBC #/AREA URNS HPF: (no result) /HPF (ref 0–3)
WBC NRBC COR # BLD AUTO: 17.3 K/UL (ref 4.3–11)

## 2018-09-10 RX ADMIN — MEROPENEM SCH MLS/HR: 1 INJECTION INTRAVENOUS at 21:08

## 2018-09-10 RX ADMIN — ALBUTEROL SULFATE SCH MG: 2.5 SOLUTION RESPIRATORY (INHALATION) at 11:13

## 2018-09-10 RX ADMIN — Medication SCH MG: at 15:00

## 2018-09-10 RX ADMIN — PANTOPRAZOLE SODIUM SCH MG: 40 TABLET, DELAYED RELEASE ORAL at 08:37

## 2018-09-10 RX ADMIN — Medication SCH MG: at 15:21

## 2018-09-10 RX ADMIN — KETOROLAC TROMETHAMINE PRN MG: 30 INJECTION, SOLUTION INTRAMUSCULAR at 07:15

## 2018-09-10 RX ADMIN — PIPERACILLIN SODIUM AND TAZOBACTAM SODIUM SCH MLS/HR: .375; 3 INJECTION, POWDER, LYOPHILIZED, FOR SOLUTION INTRAVENOUS at 05:21

## 2018-09-10 RX ADMIN — ALBUTEROL SULFATE SCH MG: 2.5 SOLUTION RESPIRATORY (INHALATION) at 07:57

## 2018-09-10 RX ADMIN — KETOROLAC TROMETHAMINE PRN MG: 30 INJECTION, SOLUTION INTRAMUSCULAR at 00:52

## 2018-09-10 RX ADMIN — ALBUTEROL SULFATE SCH MG: 2.5 SOLUTION RESPIRATORY (INHALATION) at 15:21

## 2018-09-10 RX ADMIN — Medication SCH MLS/HR: at 16:10

## 2018-09-10 RX ADMIN — ALBUTEROL SULFATE SCH MG: 2.5 SOLUTION RESPIRATORY (INHALATION) at 19:55

## 2018-09-10 RX ADMIN — ALBUTEROL SULFATE SCH MG: 2.5 SOLUTION RESPIRATORY (INHALATION) at 03:07

## 2018-09-10 RX ADMIN — Medication SCH MG: at 03:00

## 2018-09-10 RX ADMIN — CYCLOBENZAPRINE HYDROCHLORIDE PRN MG: 10 TABLET, FILM COATED ORAL at 05:21

## 2018-09-10 RX ADMIN — PIPERACILLIN SODIUM AND TAZOBACTAM SODIUM SCH MLS/HR: .375; 3 INJECTION, POWDER, LYOPHILIZED, FOR SOLUTION INTRAVENOUS at 12:42

## 2018-09-10 RX ADMIN — Medication SCH MG: at 03:07

## 2018-09-10 RX ADMIN — DEXTROSE MONOHYDRATE SCH MLS/HR: 50 INJECTION, SOLUTION INTRAVENOUS at 18:24

## 2018-09-10 RX ADMIN — KETOROLAC TROMETHAMINE PRN MG: 30 INJECTION, SOLUTION INTRAMUSCULAR at 13:17

## 2018-09-10 RX ADMIN — Medication SCH MG: at 19:55

## 2018-09-10 RX ADMIN — Medication SCH MG: at 16:10

## 2018-09-10 RX ADMIN — ALBUTEROL SULFATE SCH MG: 2.5 SOLUTION RESPIRATORY (INHALATION) at 23:47

## 2018-09-10 RX ADMIN — Medication SCH MG: at 11:13

## 2018-09-10 RX ADMIN — Medication SCH MG: at 07:57

## 2018-09-10 RX ADMIN — Medication SCH MG: at 08:37

## 2018-09-10 RX ADMIN — Medication PRN EACH: at 05:22

## 2018-09-10 RX ADMIN — KETOROLAC TROMETHAMINE PRN MG: 30 INJECTION, SOLUTION INTRAMUSCULAR at 18:59

## 2018-09-10 RX ADMIN — Medication SCH MG: at 23:47

## 2018-09-10 NOTE — NUR
MSRN

FULLY AWAKE, COLOSTOMY FULL, CHANGED WHOLE DEVICE.  STOOL APPEARS PASTY ABOUT TO LEAK.  
COLOSTOMY CARE STARTED, BEDSIDE TEACHING REGARDING COLOSTOMY CARE. APPEARS TO UNDERSTAND.  
KEPT COMFORTABLE, ENCOURAGE TO USE INCENTIVE SPIROMETRY AND AMBULATION.  PLAN OF CARE AND 
MEDICATION REGIMEN WELL UNDERSTOOD. TO CONTINUE.

## 2018-09-10 NOTE — NUR
MSRN

SEEN BY SURGEON.  INFORMED OF TEMP EARLIER 100.3.  RELAYED TO SURGEON REGARDING TORADOL 
ORDER. RENEWED. PER MD WILL DO CT OF ABDOMEN TOMORROW  NO CONTRAST IF SERUM WBC REMAINS 
17,000 OR 18,000.

## 2018-09-10 NOTE — NUR
RN NOTES

PATIENT A/OX4, PATIENT'S OSTOMY BAG CHANGED X1, ABLE TO AMBULATE TO RESTROOM, PATIENT DENIES 
PAIN OR DISCOMFORT AT THIS TIME, PREVIOUS DAPHNEY SITE STILL DRAINING. [place] NP MADE AWARE. 
PATIENT ENCOURAGED TO USE INCENTIVE SPIROMETER. NEEDS ATTENDED AND MET, CALL LIGHT WITHIN 
REACH, WILL ENDORSE TO NIGHT SHIFT FOR FANTA.

## 2018-09-10 NOTE — NUR
RN CLOSING NOTES



PATIENT IN BED, SLEPT INTERMITTENTLY THROUGHOUT THE SHIFT, PT ABLE TO SAFELY AMBULATE TO THE 
BATHROOM INDEPENDENTLY AS TOLERATED,  AWARE OF SAFETY NEEDS. EDUCATED PATIENT TO WEAN OFF 
IVP PAIN MEDICATION, VERBALIZED UNDERSTANDING. NOTED PT'S COLOSTOMY BAG WITH MINIMAL OUTPUT 
OF SOFT BROWN STOOL. DAPHNEY DRAIN SITE ON MID ABDOMEN OOZING WITH SEROSANGINUOS FLUID, NO FOUL 
ODOR, DRESSING CHANGED PRN.  IV PERIPHERAL INTACT AND PATENT ON RAC G#22. ALL PATIENT'S 
NEEDS ATTENDED TO. CALLL LIGIHT PLACED WITHIN EASY REACH. BED IN LOW POSITION AND LOCKED IN 
PLACE. WILL ENDORSE TO AM SHIFT NURSE FOR CONTINUITY OF CARE.

## 2018-09-10 NOTE — NUR
RN NOTES

PATIENT A/OX4, IN BED, ENCOURAGED PATIENT TO GET OUT OF BED AND AMBULATE, KEPT COMFORTABLE 
AT THIS TIME, NEEDS ATTENDED, CALL LIGHT WITHIN REACH, WILL CONTINUE TO MONITOR.

## 2018-09-11 VITALS — SYSTOLIC BLOOD PRESSURE: 173 MMHG | DIASTOLIC BLOOD PRESSURE: 97 MMHG

## 2018-09-11 VITALS — SYSTOLIC BLOOD PRESSURE: 156 MMHG | DIASTOLIC BLOOD PRESSURE: 92 MMHG

## 2018-09-11 VITALS — SYSTOLIC BLOOD PRESSURE: 155 MMHG | DIASTOLIC BLOOD PRESSURE: 93 MMHG

## 2018-09-11 LAB
BASOPHILS # BLD AUTO: 0 /CMM (ref 0–0.2)
BASOPHILS NFR BLD AUTO: 0 % (ref 0–2)
BUN SERPL-MCNC: 13 MG/DL (ref 7–18)
CALCIUM SERPL-MCNC: 7.8 MG/DL (ref 8.5–10.1)
CHLORIDE SERPL-SCNC: 101 MMOL/L (ref 98–107)
CO2 SERPL-SCNC: 27 MMOL/L (ref 21–32)
CREAT SERPL-MCNC: 0.9 MG/DL (ref 0.6–1.3)
EOSINOPHIL NFR BLD AUTO: 0.9 % (ref 0–6)
GLUCOSE SERPL-MCNC: 91 MG/DL (ref 74–106)
HCT VFR BLD AUTO: 37 % (ref 39–51)
HGB BLD-MCNC: 12.1 G/DL (ref 13.5–17.5)
LYMPHOCYTES NFR BLD AUTO: 1.2 /CMM (ref 0.8–4.8)
LYMPHOCYTES NFR BLD AUTO: 8.2 % (ref 20–44)
MAGNESIUM SERPL-MCNC: 2.3 MG/DL (ref 1.8–2.4)
MCHC RBC AUTO-ENTMCNC: 33 G/DL (ref 31–36)
MCV RBC AUTO: 90 FL (ref 80–96)
MONOCYTES NFR BLD AUTO: 1.2 /CMM (ref 0.1–1.3)
MONOCYTES NFR BLD AUTO: 8 % (ref 2–12)
NEUTROPHILS # BLD AUTO: 12.1 /CMM (ref 1.8–8.9)
NEUTROPHILS NFR BLD AUTO: 82.9 % (ref 43–81)
PHOSPHATE SERPL-MCNC: 3.6 MG/DL (ref 2.5–4.9)
PLATELET # BLD AUTO: 473 /CMM (ref 150–450)
POTASSIUM SERPL-SCNC: 4.3 MMOL/L (ref 3.5–5.1)
RBC # BLD AUTO: 4.07 MIL/UL (ref 4.5–6)
RDW COEFFICIENT OF VARIATION: 13.2 (ref 11.5–15)
SODIUM SERPL-SCNC: 136 MMOL/L (ref 136–145)
WBC NRBC COR # BLD AUTO: 14.6 K/UL (ref 4.3–11)

## 2018-09-11 RX ADMIN — Medication SCH MG: at 03:00

## 2018-09-11 RX ADMIN — Medication SCH MG: at 07:28

## 2018-09-11 RX ADMIN — MEROPENEM SCH MLS/HR: 1 INJECTION INTRAVENOUS at 12:46

## 2018-09-11 RX ADMIN — DEXTROSE MONOHYDRATE SCH MLS/HR: 50 INJECTION, SOLUTION INTRAVENOUS at 10:40

## 2018-09-11 RX ADMIN — MEROPENEM SCH MLS/HR: 1 INJECTION INTRAVENOUS at 05:22

## 2018-09-11 RX ADMIN — HYDROCODONE BITARTRATE AND ACETAMINOPHEN PRN EA: 10; 325 TABLET ORAL at 01:45

## 2018-09-11 RX ADMIN — ALBUTEROL SULFATE SCH MG: 2.5 SOLUTION RESPIRATORY (INHALATION) at 07:28

## 2018-09-11 RX ADMIN — Medication SCH MG: at 11:57

## 2018-09-11 RX ADMIN — CYCLOBENZAPRINE HYDROCHLORIDE PRN MG: 10 TABLET, FILM COATED ORAL at 08:24

## 2018-09-11 RX ADMIN — DEXTROSE MONOHYDRATE SCH MLS/HR: 50 INJECTION, SOLUTION INTRAVENOUS at 16:55

## 2018-09-11 RX ADMIN — CYCLOBENZAPRINE HYDROCHLORIDE PRN MG: 10 TABLET, FILM COATED ORAL at 16:25

## 2018-09-11 RX ADMIN — MEROPENEM SCH MLS/HR: 1 INJECTION INTRAVENOUS at 20:08

## 2018-09-11 RX ADMIN — Medication SCH MG: at 16:25

## 2018-09-11 RX ADMIN — HYDROCODONE BITARTRATE AND ACETAMINOPHEN PRN EA: 10; 325 TABLET ORAL at 20:05

## 2018-09-11 RX ADMIN — Medication SCH MG: at 15:00

## 2018-09-11 RX ADMIN — Medication SCH MG: at 03:30

## 2018-09-11 RX ADMIN — DEXTROSE MONOHYDRATE SCH MLS/HR: 50 INJECTION, SOLUTION INTRAVENOUS at 01:45

## 2018-09-11 RX ADMIN — PANTOPRAZOLE SODIUM SCH MG: 40 TABLET, DELAYED RELEASE ORAL at 08:24

## 2018-09-11 RX ADMIN — Medication SCH MG: at 14:58

## 2018-09-11 RX ADMIN — Medication SCH MG: at 19:37

## 2018-09-11 RX ADMIN — ALBUTEROL SULFATE SCH MG: 2.5 SOLUTION RESPIRATORY (INHALATION) at 19:37

## 2018-09-11 RX ADMIN — ALBUTEROL SULFATE SCH MG: 2.5 SOLUTION RESPIRATORY (INHALATION) at 11:57

## 2018-09-11 RX ADMIN — CYCLOBENZAPRINE HYDROCHLORIDE PRN MG: 10 TABLET, FILM COATED ORAL at 02:28

## 2018-09-11 RX ADMIN — Medication SCH MLS/HR: at 16:24

## 2018-09-11 RX ADMIN — Medication SCH MG: at 08:24

## 2018-09-11 RX ADMIN — ALBUTEROL SULFATE SCH MG: 2.5 SOLUTION RESPIRATORY (INHALATION) at 03:30

## 2018-09-11 RX ADMIN — ALBUTEROL SULFATE SCH MG: 2.5 SOLUTION RESPIRATORY (INHALATION) at 14:58

## 2018-09-11 NOTE — NUR
MSRN

DUE MEDS ADMINISTERED.  STATED WILL CALL FOR PAIN MEDS LATER IF NEEDED.  STABEL FOR NOW.  
REMAINS AFEBRILE

## 2018-09-11 NOTE — NUR
MS RN

RECEIVED ON BED, AWAKE,ALERT,ORIENTED X4,NOT IN ANY FORM OF DISTRESS,RESPIRATIONS EVEN AD 
UNLABORED,NO SOB NOTED, LUNGS ARE CLEAR,ABDOMEN SOFT,COLOSTOMY INTACT W/ BROWN COLORED 
OUTPUT, WILL MONITOR PATIENT.

## 2018-09-11 NOTE — NUR
rn ms opening notes

received patient in bed awake alert and oriented x 4, respirations even and unlabored with 
equal rise and fall of chest, spo2 ra at 99%, no sob present at this time, denies any pain 
or discomfort, iv site to right ac #22 g intact and patent, no redness, no infiltration 
present. oriented to staff and call light, patient is ambulatory motivated to self care, per 
patient will like curtain and door close everytime. oriented to call bell in need of 
assistance verbalize he understands. colostomy bad to left side quadrant intact . all needs 
attended, call light kept within reach, will continue to monitor.

## 2018-09-11 NOTE — NUR
RN MS NOTES

PATIENT SEEN BY DR SUÁREZ. EZ WITH NEW ORDER FOR CT SCAN OF ABDOMEN ,PELVIS AND 
CHEST WITH PO AND IV CONTRAST.

## 2018-09-12 VITALS — SYSTOLIC BLOOD PRESSURE: 144 MMHG | DIASTOLIC BLOOD PRESSURE: 83 MMHG

## 2018-09-12 VITALS — SYSTOLIC BLOOD PRESSURE: 144 MMHG | DIASTOLIC BLOOD PRESSURE: 87 MMHG

## 2018-09-12 VITALS — DIASTOLIC BLOOD PRESSURE: 88 MMHG | SYSTOLIC BLOOD PRESSURE: 125 MMHG

## 2018-09-12 VITALS — DIASTOLIC BLOOD PRESSURE: 83 MMHG | SYSTOLIC BLOOD PRESSURE: 144 MMHG

## 2018-09-12 LAB
BASOPHILS # BLD AUTO: 0.1 /CMM (ref 0–0.2)
BASOPHILS NFR BLD AUTO: 0.5 % (ref 0–2)
BUN SERPL-MCNC: 10 MG/DL (ref 7–18)
CALCIUM SERPL-MCNC: 7.8 MG/DL (ref 8.5–10.1)
CHLORIDE SERPL-SCNC: 101 MMOL/L (ref 98–107)
CO2 SERPL-SCNC: 27 MMOL/L (ref 21–32)
CREAT SERPL-MCNC: 1 MG/DL (ref 0.6–1.3)
EOSINOPHIL NFR BLD AUTO: 1.9 % (ref 0–6)
GLUCOSE SERPL-MCNC: 92 MG/DL (ref 74–106)
HCT VFR BLD AUTO: 36 % (ref 39–51)
HGB BLD-MCNC: 11.4 G/DL (ref 13.5–17.5)
LYMPHOCYTES NFR BLD AUTO: 1.3 /CMM (ref 0.8–4.8)
LYMPHOCYTES NFR BLD AUTO: 9.8 % (ref 20–44)
MCHC RBC AUTO-ENTMCNC: 32 G/DL (ref 31–36)
MCV RBC AUTO: 91 FL (ref 80–96)
MONOCYTES NFR BLD AUTO: 1.2 /CMM (ref 0.1–1.3)
MONOCYTES NFR BLD AUTO: 8.7 % (ref 2–12)
NEUTROPHILS # BLD AUTO: 10.8 /CMM (ref 1.8–8.9)
NEUTROPHILS NFR BLD AUTO: 79.1 % (ref 43–81)
PLATELET # BLD AUTO: 579 /CMM (ref 150–450)
POTASSIUM SERPL-SCNC: 3.9 MMOL/L (ref 3.5–5.1)
RBC # BLD AUTO: 3.92 MIL/UL (ref 4.5–6)
RDW COEFFICIENT OF VARIATION: 13.3 (ref 11.5–15)
SODIUM SERPL-SCNC: 135 MMOL/L (ref 136–145)
WBC NRBC COR # BLD AUTO: 13.7 K/UL (ref 4.3–11)

## 2018-09-12 RX ADMIN — Medication SCH MG: at 11:02

## 2018-09-12 RX ADMIN — DEXTROSE MONOHYDRATE SCH MLS/HR: 50 INJECTION, SOLUTION INTRAVENOUS at 18:49

## 2018-09-12 RX ADMIN — ALBUTEROL SULFATE SCH MG: 2.5 SOLUTION RESPIRATORY (INHALATION) at 06:58

## 2018-09-12 RX ADMIN — MEROPENEM SCH MLS/HR: 1 INJECTION INTRAVENOUS at 05:19

## 2018-09-12 RX ADMIN — CYCLOBENZAPRINE HYDROCHLORIDE PRN MG: 10 TABLET, FILM COATED ORAL at 02:40

## 2018-09-12 RX ADMIN — Medication SCH MG: at 06:58

## 2018-09-12 RX ADMIN — ALBUTEROL SULFATE SCH MG: 2.5 SOLUTION RESPIRATORY (INHALATION) at 00:08

## 2018-09-12 RX ADMIN — ALBUTEROL SULFATE SCH MG: 2.5 SOLUTION RESPIRATORY (INHALATION) at 11:02

## 2018-09-12 RX ADMIN — MEROPENEM SCH MLS/HR: 1 INJECTION INTRAVENOUS at 13:48

## 2018-09-12 RX ADMIN — Medication SCH MLS/HR: at 16:17

## 2018-09-12 RX ADMIN — PANTOPRAZOLE SODIUM SCH MG: 40 TABLET, DELAYED RELEASE ORAL at 08:30

## 2018-09-12 RX ADMIN — Medication SCH MG: at 15:00

## 2018-09-12 RX ADMIN — ALBUTEROL SULFATE SCH MG: 2.5 SOLUTION RESPIRATORY (INHALATION) at 19:30

## 2018-09-12 RX ADMIN — DEXTROSE MONOHYDRATE SCH MLS/HR: 50 INJECTION, SOLUTION INTRAVENOUS at 10:16

## 2018-09-12 RX ADMIN — Medication SCH MG: at 15:59

## 2018-09-12 RX ADMIN — MEROPENEM SCH MLS/HR: 1 INJECTION INTRAVENOUS at 20:33

## 2018-09-12 RX ADMIN — ALBUTEROL SULFATE SCH MG: 2.5 SOLUTION RESPIRATORY (INHALATION) at 15:59

## 2018-09-12 RX ADMIN — ALBUTEROL SULFATE SCH MG: 2.5 SOLUTION RESPIRATORY (INHALATION) at 03:17

## 2018-09-12 RX ADMIN — DEXTROSE MONOHYDRATE SCH MLS/HR: 50 INJECTION, SOLUTION INTRAVENOUS at 01:11

## 2018-09-12 RX ADMIN — HYDROCODONE BITARTRATE AND ACETAMINOPHEN PRN EA: 10; 325 TABLET ORAL at 18:44

## 2018-09-12 RX ADMIN — Medication SCH MG: at 00:08

## 2018-09-12 RX ADMIN — Medication SCH MG: at 16:17

## 2018-09-12 RX ADMIN — Medication SCH MG: at 08:30

## 2018-09-12 RX ADMIN — Medication SCH MG: at 19:58

## 2018-09-12 RX ADMIN — CYCLOBENZAPRINE HYDROCHLORIDE PRN MG: 10 TABLET, FILM COATED ORAL at 18:44

## 2018-09-12 RX ADMIN — Medication SCH MG: at 02:20

## 2018-09-12 RX ADMIN — Medication SCH MG: at 03:17

## 2018-09-12 NOTE — NUR
RN NOTES



DR. SUÁREZ MADE AWARE OF CT RESULTS. PER MD CBC AND REGULAR DIET ORDERED. ORDERED TO MONITOR 
TEMPERATURE AND WBC COUNT. WILL CONTINUE TO MONITOR.

## 2018-09-12 NOTE — NUR
rn ms notes

 per rt patient stated do not wake up if sleeping 

rt treatment was skipped per pr request

## 2018-09-12 NOTE — NUR
MS RN NOTE - Orders from Dr. Malagon



Orders received from Dr. Malagon passed on from [facility] nurse - CBC, PT/INR, PTT, NPO at 
midnight, CT guided drainage of abdominal abscess. Message sent to Dr. Malagon to confirm CT 
scan. Per Dr. Malagon - patient is to have CT of abdomen/pelvis with or without contrast for 
guided drainage of abdominal abscess. Dr. Malagon would like interventional radiology to 
decide whether or not to use contrast.

## 2018-09-12 NOTE — NUR
RN OPENING NOTES



PT IN BED RESTING. NO S/S OF RESP DISTRESS OR SOB. NO C/O PAIN AT THIS TIME. PT IS 
REQUESTING THAT HE NOT BE DISTURBED AT THIS TIME. IV ACCESS NOTED ON LFA, CURRENTLY SL. TO 
F/U ON RESULTS OF CT SCAN TAKEN IN EARLY AM. SAFETY MEASURES IN PLACE, CALL LIGHT WITHIN 
REACH. WILL CONTINUE TO MONITOR.

## 2018-09-12 NOTE — NUR
dereje ms notes 

patient was instructed to drink barium sulfate oral suspension 2% for ct scan as ordered.

## 2018-09-12 NOTE — NUR
rn ms closing notes

patient in bed awake alert and oriented x 4, respirations even and unlabored with equal rise 
and fall chest, denies any pain or discomfort at this time, new iv site started to left fa 
#22 g, intact and patent, no redness, no infiltration present. right fa site removed due to 
dislodged.colostomy bag remains intact, safety precautions in place, low bed and locked, 
call light kept within reach. all needs attended at this time, antibiotics infused as 
ordered, fluids offered patient left comfortable will continue to monitor and endorse to 
next shift.

## 2018-09-12 NOTE — NUR
rn ms notes

relayed to np marcus jean baptiste regarding results for ct, no new orders at this time per np 
follow up in am with primary and surgeon.

## 2018-09-12 NOTE — NUR
MS RN OPENING NOTE



Patient was seen sitting upright in bed AAOx4, breathing on RA with no SOB, and no signs of 
acute distress. IV Vanco is running through the left FA IV at 250ml/hr with no signs of 
leaking, infiltration, or phlebitis. Four small incisions are noted on the patient's abdomen 
from laparscopic surgery; incisions are well approximated, closed, no drainage, no 
inflammation. Colostomy on left side is draining soft/liquid brown stool. Patient is 
educated on maintenance of colostomy and independent with self care. Bed is low/locked, two 
side rails up, and call bell within reach. Patient has no immediate needs or concerns at 
this time. Will continue to monitor.

## 2018-09-12 NOTE — NUR
rn ms notes

patient complaint of muscle spasm to side torso area,  requesting for flexiril , prn given 
as ordered

## 2018-09-13 VITALS — SYSTOLIC BLOOD PRESSURE: 121 MMHG | DIASTOLIC BLOOD PRESSURE: 65 MMHG

## 2018-09-13 VITALS — SYSTOLIC BLOOD PRESSURE: 124 MMHG | DIASTOLIC BLOOD PRESSURE: 70 MMHG

## 2018-09-13 VITALS — DIASTOLIC BLOOD PRESSURE: 94 MMHG | SYSTOLIC BLOOD PRESSURE: 150 MMHG

## 2018-09-13 LAB
APTT PPP: 25 SEC (ref 23–34)
BASOPHILS # BLD AUTO: 0.1 /CMM (ref 0–0.2)
BASOPHILS NFR BLD AUTO: 0.4 % (ref 0–2)
BUN SERPL-MCNC: 14 MG/DL (ref 7–18)
CALCIUM SERPL-MCNC: 7.9 MG/DL (ref 8.5–10.1)
CHLORIDE SERPL-SCNC: 102 MMOL/L (ref 98–107)
CO2 SERPL-SCNC: 25 MMOL/L (ref 21–32)
CREAT SERPL-MCNC: 1.1 MG/DL (ref 0.6–1.3)
EOSINOPHIL NFR BLD AUTO: 2.8 % (ref 0–6)
GLUCOSE SERPL-MCNC: 103 MG/DL (ref 74–106)
HCT VFR BLD AUTO: 35 % (ref 39–51)
HGB BLD-MCNC: 11.4 G/DL (ref 13.5–17.5)
INR PPP: 0.97 (ref 0.87–1.13)
LYMPHOCYTES NFR BLD AUTO: 1 /CMM (ref 0.8–4.8)
LYMPHOCYTES NFR BLD AUTO: 7.3 % (ref 20–44)
MCHC RBC AUTO-ENTMCNC: 32 G/DL (ref 31–36)
MCV RBC AUTO: 90 FL (ref 80–96)
MONOCYTES NFR BLD AUTO: 1.2 /CMM (ref 0.1–1.3)
MONOCYTES NFR BLD AUTO: 8.8 % (ref 2–12)
NEUTROPHILS # BLD AUTO: 10.9 /CMM (ref 1.8–8.9)
NEUTROPHILS NFR BLD AUTO: 80.7 % (ref 43–81)
PLATELET # BLD AUTO: 696 /CMM (ref 150–450)
POTASSIUM SERPL-SCNC: 3.9 MMOL/L (ref 3.5–5.1)
RBC # BLD AUTO: 3.92 MIL/UL (ref 4.5–6)
RDW COEFFICIENT OF VARIATION: 13.4 (ref 11.5–15)
SODIUM SERPL-SCNC: 136 MMOL/L (ref 136–145)
WBC NRBC COR # BLD AUTO: 13.5 K/UL (ref 4.3–11)

## 2018-09-13 RX ADMIN — Medication SCH MG: at 10:59

## 2018-09-13 RX ADMIN — ALBUTEROL SULFATE SCH MG: 2.5 SOLUTION RESPIRATORY (INHALATION) at 00:08

## 2018-09-13 RX ADMIN — Medication SCH MLS/HR: at 16:15

## 2018-09-13 RX ADMIN — Medication SCH MG: at 00:07

## 2018-09-13 RX ADMIN — HYDROCODONE BITARTRATE AND ACETAMINOPHEN PRN EA: 10; 325 TABLET ORAL at 01:35

## 2018-09-13 RX ADMIN — ALBUTEROL SULFATE SCH MG: 2.5 SOLUTION RESPIRATORY (INHALATION) at 20:07

## 2018-09-13 RX ADMIN — ALBUTEROL SULFATE SCH MG: 2.5 SOLUTION RESPIRATORY (INHALATION) at 23:30

## 2018-09-13 RX ADMIN — MEROPENEM SCH MLS/HR: 1 INJECTION INTRAVENOUS at 12:42

## 2018-09-13 RX ADMIN — Medication SCH MG: at 15:38

## 2018-09-13 RX ADMIN — DEXTROSE MONOHYDRATE SCH MLS/HR: 50 INJECTION, SOLUTION INTRAVENOUS at 18:22

## 2018-09-13 RX ADMIN — PANTOPRAZOLE SODIUM SCH MG: 40 TABLET, DELAYED RELEASE ORAL at 07:30

## 2018-09-13 RX ADMIN — ALBUTEROL SULFATE SCH MG: 2.5 SOLUTION RESPIRATORY (INHALATION) at 07:54

## 2018-09-13 RX ADMIN — CYCLOBENZAPRINE HYDROCHLORIDE PRN MG: 10 TABLET, FILM COATED ORAL at 15:19

## 2018-09-13 RX ADMIN — Medication SCH MG: at 23:30

## 2018-09-13 RX ADMIN — Medication SCH MG: at 02:57

## 2018-09-13 RX ADMIN — Medication SCH MG: at 07:54

## 2018-09-13 RX ADMIN — MEROPENEM SCH MLS/HR: 1 INJECTION INTRAVENOUS at 13:00

## 2018-09-13 RX ADMIN — ALBUTEROL SULFATE SCH MG: 2.5 SOLUTION RESPIRATORY (INHALATION) at 03:30

## 2018-09-13 RX ADMIN — Medication SCH MG: at 08:36

## 2018-09-13 RX ADMIN — HYDROCODONE BITARTRATE AND ACETAMINOPHEN PRN EA: 10; 325 TABLET ORAL at 05:42

## 2018-09-13 RX ADMIN — Medication SCH MG: at 15:00

## 2018-09-13 RX ADMIN — ALBUTEROL SULFATE SCH MG: 2.5 SOLUTION RESPIRATORY (INHALATION) at 15:38

## 2018-09-13 RX ADMIN — DEXTROSE MONOHYDRATE SCH MLS/HR: 50 INJECTION, SOLUTION INTRAVENOUS at 09:49

## 2018-09-13 RX ADMIN — ALBUTEROL SULFATE SCH MG: 2.5 SOLUTION RESPIRATORY (INHALATION) at 10:59

## 2018-09-13 RX ADMIN — Medication SCH MG: at 16:35

## 2018-09-13 RX ADMIN — Medication SCH MG: at 20:07

## 2018-09-13 RX ADMIN — MEROPENEM SCH MLS/HR: 1 INJECTION INTRAVENOUS at 05:46

## 2018-09-13 RX ADMIN — HYDROCODONE BITARTRATE AND ACETAMINOPHEN PRN EA: 10; 325 TABLET ORAL at 18:37

## 2018-09-13 RX ADMIN — Medication SCH MG: at 03:30

## 2018-09-13 RX ADMIN — MEROPENEM SCH MLS/HR: 1 INJECTION INTRAVENOUS at 20:59

## 2018-09-13 RX ADMIN — DEXTROSE MONOHYDRATE SCH MLS/HR: 50 INJECTION, SOLUTION INTRAVENOUS at 01:35

## 2018-09-13 NOTE — NUR
RN NOTES



PT TAKEN FOR CT GUIDED DRAINING OF ABDOMINAL ABSCESS. TAKEN BY RADIOLOGY. PT HAS BEEN NPO 
SINCE MIDNIGHT. WILL CONTINUE TO MONITOR UPON RETURN TO UNIT.

## 2018-09-13 NOTE — NUR
MS RN NOTES

TRANSFER FROM [facility] THIS 47 YO MALE A/O X 4,AMBULATORY S/P AX LAP WITH PARTIAL BOWEL 
RESECTION OF SIGMOID COLON WITH COLOSTOMY ON 9/1 /18.A/O X4, SALINE LOCK LEFT POSTERIOR ARM 
INFILTRATED.NEW SALINE LOCK PLACE ON LEFT LOWER ARM #20,FLUSHED WITH NS AND KEPT 
PATENT.DENIES PAIN AT THE MOMENT.CALL LIGHT IN REACH,NEEDS ANTICIPATED.

## 2018-09-13 NOTE — NUR
RN CLOSING NOTES



PT IN BED RESTING.  A/OX4. NO S/S OF RESP DISTRESS OR SOB. PT HAS C/O MILD PAIN, 3/10 
LOCATED NEAR LLQ ABDOMINAL WALL. CT GUIDED DRAINING OF ABDOMINAL ABSCESS.  PT RETURNED TO 
UNIT WITH OUT C/O PAIN, ABLE TO TOLERATE REGULAR DIET. SAFETY MEASURES IN PLACE, CALL LIGHT 
WITHIN REACH. WILL ENDORSE TO NIGHT SHIFT FOR FANTA.

## 2018-09-13 NOTE — NUR
MS RN CLOSING NOTE - Transfer to MS-2



Patient was transferred to MS-2 AAOx4 and in stable condition. All patient belongings and 
medical chart were transferred with patient. Report given to [facility] for continuity of care.

## 2018-09-13 NOTE — NUR
MS RN NOTES

KEPT NPO FOR CT ABDOMEN/PELVIS WITH OR WITHOUT CONTRAST FOR GUIDED DRAINAGE OF ABSCESS,CALM 
AND COOPERATIVE.COMPLIANT WITH CARE AND MEDS.IN NO ACUTE DISTRESS.WILL ENDORSE TO DAY NURSE 
FOR FANTA.

## 2018-09-13 NOTE — NUR
MS RN NOTES PAIN MANAGEMENT

C/O ABDOMINAL PAIN 8/10 ON PAIN SCALE,NORCO 10/325MG,1 TAB PO GIVEN WITH SIPS OF WATER

## 2018-09-13 NOTE — NUR
MS RN NOTE - Norco, refuses Oxy



Patient requested Falun for pain relief. Reports having about 8/10 abdominal pain (s/p 
surgery). Patient was informed that he has oxycontin scheduled q12h, due at 03:00. Patient 
refused oxycontin, and states that he has refused the last two scheduled doses of oxy. 

PO Norco 10/325mg was administered for pain relief per prn orders.

## 2018-09-13 NOTE — NUR
MS RN OPENING/NOTES



PT IN AWAKE IN BED. A.O X3, BREATHING EVEN AND UNLABORED, NO S/S OF ACUTE DISTRESS.BED IS IN 
LOWEST, AND LOCKED POSITION. 2 X SIDE RAIL UP INSTRUCTED TO USE CALL LIGHT FOR ASSISTANCE.

## 2018-09-13 NOTE — NUR
RN OPENING NOTES 



RECEIVED PT. PT IS STABLE AND RESTING IN BED. A/OX4. NO S/S OF RESP DISTRESS OR SOB. NO C/O 
PAIN. PT REPORTS NO INCREASED TEMPERATURE. ABLE TO TOLERATE REGULAR DIET LAST NIGHT 9/12. PT 
IS CURRENTLY NPO FOR CT GUIDED DRAINAGE OF ABDOMINAL ABSCESS.  CONSENT FORM SIGNED AND 
PLACED IN CHART. WILL F/U WITH RADIOLOGY REGARDING TIME OF PROCEDURE. SAFETY MEASURES IN 
PLACE, CALL LIGHT WITHIN REACH. WILL CONTINUE TO MONITOR.

## 2018-09-14 VITALS — SYSTOLIC BLOOD PRESSURE: 135 MMHG | DIASTOLIC BLOOD PRESSURE: 95 MMHG

## 2018-09-14 VITALS — SYSTOLIC BLOOD PRESSURE: 123 MMHG | DIASTOLIC BLOOD PRESSURE: 89 MMHG

## 2018-09-14 VITALS — SYSTOLIC BLOOD PRESSURE: 152 MMHG | DIASTOLIC BLOOD PRESSURE: 94 MMHG

## 2018-09-14 LAB
BASOPHILS # BLD AUTO: 0.1 /CMM (ref 0–0.2)
BASOPHILS NFR BLD AUTO: 1 % (ref 0–2)
BUN SERPL-MCNC: 12 MG/DL (ref 7–18)
CALCIUM SERPL-MCNC: 8.7 MG/DL (ref 8.5–10.1)
CHLORIDE SERPL-SCNC: 100 MMOL/L (ref 98–107)
CO2 SERPL-SCNC: 25 MMOL/L (ref 21–32)
CREAT SERPL-MCNC: 1 MG/DL (ref 0.6–1.3)
EOSINOPHIL NFR BLD AUTO: 3.2 % (ref 0–6)
GLUCOSE SERPL-MCNC: 132 MG/DL (ref 74–106)
HCT VFR BLD AUTO: 40 % (ref 39–51)
HGB BLD-MCNC: 12.8 G/DL (ref 13.5–17.5)
LYMPHOCYTES NFR BLD AUTO: 1.4 /CMM (ref 0.8–4.8)
LYMPHOCYTES NFR BLD AUTO: 10.7 % (ref 20–44)
MAGNESIUM SERPL-MCNC: 2.2 MG/DL (ref 1.8–2.4)
MCHC RBC AUTO-ENTMCNC: 32 G/DL (ref 31–36)
MCV RBC AUTO: 89 FL (ref 80–96)
MONOCYTES NFR BLD AUTO: 1.2 /CMM (ref 0.1–1.3)
MONOCYTES NFR BLD AUTO: 9 % (ref 2–12)
NEUTROPHILS # BLD AUTO: 10.3 /CMM (ref 1.8–8.9)
NEUTROPHILS NFR BLD AUTO: 76.1 % (ref 43–81)
PHOSPHATE SERPL-MCNC: 3.1 MG/DL (ref 2.5–4.9)
PLATELET # BLD AUTO: 832 /CMM (ref 150–450)
POTASSIUM SERPL-SCNC: 4.1 MMOL/L (ref 3.5–5.1)
RBC # BLD AUTO: 4.44 MIL/UL (ref 4.5–6)
RDW COEFFICIENT OF VARIATION: 13.6 (ref 11.5–15)
SODIUM SERPL-SCNC: 135 MMOL/L (ref 136–145)
WBC NRBC COR # BLD AUTO: 13.5 K/UL (ref 4.3–11)

## 2018-09-14 RX ADMIN — Medication SCH MG: at 16:56

## 2018-09-14 RX ADMIN — CYCLOBENZAPRINE HYDROCHLORIDE PRN MG: 10 TABLET, FILM COATED ORAL at 21:10

## 2018-09-14 RX ADMIN — HYDROMORPHONE HYDROCHLORIDE PRN MG: 1 INJECTION, SOLUTION INTRAMUSCULAR; INTRAVENOUS; SUBCUTANEOUS at 03:35

## 2018-09-14 RX ADMIN — Medication SCH MG: at 03:00

## 2018-09-14 RX ADMIN — Medication SCH MG: at 08:50

## 2018-09-14 RX ADMIN — Medication SCH MG: at 08:31

## 2018-09-14 RX ADMIN — ALBUTEROL SULFATE SCH MG: 2.5 SOLUTION RESPIRATORY (INHALATION) at 20:21

## 2018-09-14 RX ADMIN — CYCLOBENZAPRINE HYDROCHLORIDE PRN MG: 10 TABLET, FILM COATED ORAL at 00:21

## 2018-09-14 RX ADMIN — ALBUTEROL SULFATE SCH MG: 2.5 SOLUTION RESPIRATORY (INHALATION) at 15:59

## 2018-09-14 RX ADMIN — MEROPENEM SCH MLS/HR: 1 INJECTION INTRAVENOUS at 21:12

## 2018-09-14 RX ADMIN — HYDROMORPHONE HYDROCHLORIDE PRN MG: 1 INJECTION, SOLUTION INTRAMUSCULAR; INTRAVENOUS; SUBCUTANEOUS at 18:20

## 2018-09-14 RX ADMIN — DEXTROSE MONOHYDRATE SCH MLS/HR: 50 INJECTION, SOLUTION INTRAVENOUS at 18:56

## 2018-09-14 RX ADMIN — HYDROMORPHONE HYDROCHLORIDE PRN MG: 1 INJECTION, SOLUTION INTRAMUSCULAR; INTRAVENOUS; SUBCUTANEOUS at 23:40

## 2018-09-14 RX ADMIN — HYDROMORPHONE HYDROCHLORIDE PRN MG: 1 INJECTION, SOLUTION INTRAMUSCULAR; INTRAVENOUS; SUBCUTANEOUS at 14:49

## 2018-09-14 RX ADMIN — DEXTROSE MONOHYDRATE SCH MLS/HR: 50 INJECTION, SOLUTION INTRAVENOUS at 09:59

## 2018-09-14 RX ADMIN — CYCLOBENZAPRINE HYDROCHLORIDE PRN MG: 10 TABLET, FILM COATED ORAL at 06:29

## 2018-09-14 RX ADMIN — ALBUTEROL SULFATE SCH MG: 2.5 SOLUTION RESPIRATORY (INHALATION) at 03:25

## 2018-09-14 RX ADMIN — Medication SCH MLS/HR: at 16:56

## 2018-09-14 RX ADMIN — HYDROCODONE BITARTRATE AND ACETAMINOPHEN PRN EA: 10; 325 TABLET ORAL at 06:29

## 2018-09-14 RX ADMIN — PANTOPRAZOLE SODIUM SCH MG: 40 TABLET, DELAYED RELEASE ORAL at 08:31

## 2018-09-14 RX ADMIN — ALBUTEROL SULFATE SCH MG: 2.5 SOLUTION RESPIRATORY (INHALATION) at 08:50

## 2018-09-14 RX ADMIN — Medication SCH MG: at 11:30

## 2018-09-14 RX ADMIN — HYDROCODONE BITARTRATE AND ACETAMINOPHEN PRN EA: 10; 325 TABLET ORAL at 00:21

## 2018-09-14 RX ADMIN — Medication SCH MG: at 14:50

## 2018-09-14 RX ADMIN — DEXTROSE MONOHYDRATE SCH MLS/HR: 50 INJECTION, SOLUTION INTRAVENOUS at 01:13

## 2018-09-14 RX ADMIN — Medication SCH MG: at 20:21

## 2018-09-14 RX ADMIN — Medication SCH MG: at 15:58

## 2018-09-14 RX ADMIN — HYDROCODONE BITARTRATE AND ACETAMINOPHEN PRN EA: 10; 325 TABLET ORAL at 22:47

## 2018-09-14 RX ADMIN — ALBUTEROL SULFATE SCH MG: 2.5 SOLUTION RESPIRATORY (INHALATION) at 23:48

## 2018-09-14 RX ADMIN — CYCLOBENZAPRINE HYDROCHLORIDE PRN MG: 10 TABLET, FILM COATED ORAL at 12:29

## 2018-09-14 RX ADMIN — Medication SCH MG: at 03:25

## 2018-09-14 RX ADMIN — MEROPENEM SCH MLS/HR: 1 INJECTION INTRAVENOUS at 12:46

## 2018-09-14 RX ADMIN — HYDROCODONE BITARTRATE AND ACETAMINOPHEN PRN EA: 10; 325 TABLET ORAL at 11:33

## 2018-09-14 RX ADMIN — ALBUTEROL SULFATE SCH MG: 2.5 SOLUTION RESPIRATORY (INHALATION) at 11:30

## 2018-09-14 RX ADMIN — MEROPENEM SCH MLS/HR: 1 INJECTION INTRAVENOUS at 05:20

## 2018-09-14 RX ADMIN — Medication SCH MG: at 23:48

## 2018-09-14 NOTE — NUR
MS RN NOTES



PATIENT RESTING INSIDE ROOM. AWAKE, ALERT AND ORIENTED. VERBALLY RESPONSIVE AND RESPONDS TO 
VERBAL AND TACTILE STIMULI. BREATHING EVEN AND UNLABORED. NO SOB OR ACUTE DISTRESS AT THIS 
TIME. PATIENT DENIES ANY PAIN OR DISCOMFORT AT THIS TIME. NO CHANGES IN LOC NOTED AT THIS 
TIME. IV INTACT AND PATENT, NO SWELLING OR BLEEDING ON SITE. ENDORSED TO INCOMING SHIFT FOR 
FANTA. BED LOCKED AND IN LOW POSITION. BILATERAL UPPER SIDE RAILS UP AND LOCKED. CALL LIGHT 
WITHIN EASY REACH

## 2018-09-14 NOTE — NUR
MS RN NOTES



PATIENT RECEIVED RESTING INSIDE ROOM. AWAKE, ALERT AND ORIENTED X4. VERBALLY RESPONSIVE AND 
RESPONDS TO VERBAL AND TACTILE STIMULI. BREATHING EVEN AND UNLABORED. NO SOB OR ACUTE 
DISTRESS NOTED. DENIES ANY PAIN OR DISCOMFORT. IV INTACT AND PATENT. COLOSTOMY BAG IN PLACE. 
LLQ DRAIN IN PLACE. WILL CONTINUE TO MONITOR. BED LOCKED AND IN LOW POSITION. BILATERAL 
UPPER SIDE RAILS UP AND LOCKED. CALL LIGHT WITHIN EASY REACH

## 2018-09-14 NOTE — NUR
PT IN BED, A/O X 4 VERBALLY RESPONSIVE. NO DISTRESS, NO SOB NOTED, INCENTIVE SPIROMETER 
ENCOURAGED. IV SITE ON RIGHT WRIST INTACT AND PATENT, NO S/S OF INFILTRATION NOTED AT THIS 
TIME. IV ATB INFUSING AT THIS TIME. NO C/O PAIN OR DISCOMFORT AT THIS TIME. AMBULATORY. 
COLOSTOMY IS INTACT. ALL NEEDS ATTENDED AND MET. CALL LIGHT WITHIN REACH. ENDORSED TO 
SCOTTIE LOZANO ACCORDINGLY.

## 2018-09-14 NOTE — NUR
MS RN NOTES



PT REFUSED OXYCODONE DUE AT 0300 DESPITE EXPLAINING RISKS AND BENEFITS OFFERED 3 TIMES STILL 
REFUSED PER PT HE DOESN'T LIKE TO HAVE IT FOR NOW. PT A/O X4

## 2018-09-14 NOTE — NUR
MS RN CLOSING NOTES



PT COMFORTABLY ASLEEP AND EASILY AWAKEN, STABLE, NOT IN DISTRESS. RESPIRATION EVEN AND 
UNLABORED. KEPT CLEAN AND DRY AND COMFORTABLE, ALL NURSING CARE RENDERED. NEEDS ATTENDED AND 
ANTICIPATED. NO COMPLAIN OF PAIN AT THIS TIME. ON LOW BED AT ALL TIMES TO ENSURE SAFETY. 
SAFE HAZARD FREE ENVIRONMENT PROVIDED. CALL LIGHT WITHIN EASY TO REACH. WILL ENDORSE NEXT 
SHIFT CONTINUITY OF CARE.

## 2018-09-14 NOTE — NUR
receive report from night shift rn pt a/ox4, stable, respiration even and unlabored. safety 
measures in place. will continue to monitor.

## 2018-09-15 VITALS — SYSTOLIC BLOOD PRESSURE: 126 MMHG | DIASTOLIC BLOOD PRESSURE: 65 MMHG

## 2018-09-15 VITALS — DIASTOLIC BLOOD PRESSURE: 65 MMHG | SYSTOLIC BLOOD PRESSURE: 126 MMHG

## 2018-09-15 VITALS — DIASTOLIC BLOOD PRESSURE: 94 MMHG | SYSTOLIC BLOOD PRESSURE: 131 MMHG

## 2018-09-15 VITALS — DIASTOLIC BLOOD PRESSURE: 79 MMHG | SYSTOLIC BLOOD PRESSURE: 138 MMHG

## 2018-09-15 LAB
BASOPHILS # BLD AUTO: 0 /CMM (ref 0–0.2)
BASOPHILS NFR BLD AUTO: 0.2 % (ref 0–2)
BUN SERPL-MCNC: 11 MG/DL (ref 7–18)
CALCIUM SERPL-MCNC: 8.4 MG/DL (ref 8.5–10.1)
CHLORIDE SERPL-SCNC: 99 MMOL/L (ref 98–107)
CO2 SERPL-SCNC: 30 MMOL/L (ref 21–32)
CREAT SERPL-MCNC: 1 MG/DL (ref 0.6–1.3)
EOSINOPHIL NFR BLD AUTO: 2.1 % (ref 0–6)
GLUCOSE SERPL-MCNC: 100 MG/DL (ref 74–106)
HCT VFR BLD AUTO: 36 % (ref 39–51)
HGB BLD-MCNC: 11.5 G/DL (ref 13.5–17.5)
LYMPHOCYTES NFR BLD AUTO: 1.3 /CMM (ref 0.8–4.8)
LYMPHOCYTES NFR BLD AUTO: 10.7 % (ref 20–44)
MCHC RBC AUTO-ENTMCNC: 32 G/DL (ref 31–36)
MCV RBC AUTO: 90 FL (ref 80–96)
MONOCYTES NFR BLD AUTO: 1.2 /CMM (ref 0.1–1.3)
MONOCYTES NFR BLD AUTO: 10.1 % (ref 2–12)
NEUTROPHILS # BLD AUTO: 9.2 /CMM (ref 1.8–8.9)
NEUTROPHILS NFR BLD AUTO: 76.9 % (ref 43–81)
PLATELET # BLD AUTO: 738 /CMM (ref 150–450)
POTASSIUM SERPL-SCNC: 3.9 MMOL/L (ref 3.5–5.1)
RBC # BLD AUTO: 3.95 MIL/UL (ref 4.5–6)
RDW COEFFICIENT OF VARIATION: 13.4 (ref 11.5–15)
SODIUM SERPL-SCNC: 135 MMOL/L (ref 136–145)
WBC NRBC COR # BLD AUTO: 11.9 K/UL (ref 4.3–11)

## 2018-09-15 RX ADMIN — CYCLOBENZAPRINE HYDROCHLORIDE PRN MG: 10 TABLET, FILM COATED ORAL at 16:32

## 2018-09-15 RX ADMIN — CYCLOBENZAPRINE HYDROCHLORIDE PRN MG: 10 TABLET, FILM COATED ORAL at 03:29

## 2018-09-15 RX ADMIN — HYDROCODONE BITARTRATE AND ACETAMINOPHEN PRN EA: 10; 325 TABLET ORAL at 15:01

## 2018-09-15 RX ADMIN — MEROPENEM SCH MLS/HR: 1 INJECTION INTRAVENOUS at 20:32

## 2018-09-15 RX ADMIN — DEXTROSE MONOHYDRATE SCH MLS/HR: 50 INJECTION, SOLUTION INTRAVENOUS at 17:59

## 2018-09-15 RX ADMIN — Medication SCH MG: at 12:20

## 2018-09-15 RX ADMIN — MEROPENEM SCH MLS/HR: 1 INJECTION INTRAVENOUS at 04:42

## 2018-09-15 RX ADMIN — MEROPENEM SCH MLS/HR: 1 INJECTION INTRAVENOUS at 12:28

## 2018-09-15 RX ADMIN — Medication SCH MG: at 16:32

## 2018-09-15 RX ADMIN — ALBUTEROL SULFATE SCH MG: 2.5 SOLUTION RESPIRATORY (INHALATION) at 19:26

## 2018-09-15 RX ADMIN — Medication SCH MG: at 19:26

## 2018-09-15 RX ADMIN — HYDROMORPHONE HYDROCHLORIDE PRN MG: 1 INJECTION, SOLUTION INTRAMUSCULAR; INTRAVENOUS; SUBCUTANEOUS at 04:46

## 2018-09-15 RX ADMIN — Medication SCH MG: at 23:30

## 2018-09-15 RX ADMIN — ALBUTEROL SULFATE SCH MG: 2.5 SOLUTION RESPIRATORY (INHALATION) at 03:12

## 2018-09-15 RX ADMIN — ALBUTEROL SULFATE SCH MG: 2.5 SOLUTION RESPIRATORY (INHALATION) at 14:43

## 2018-09-15 RX ADMIN — ALBUTEROL SULFATE SCH MG: 2.5 SOLUTION RESPIRATORY (INHALATION) at 07:32

## 2018-09-15 RX ADMIN — DEXTROSE MONOHYDRATE SCH MLS/HR: 50 INJECTION, SOLUTION INTRAVENOUS at 01:59

## 2018-09-15 RX ADMIN — Medication SCH MG: at 03:00

## 2018-09-15 RX ADMIN — PANTOPRAZOLE SODIUM SCH MG: 40 TABLET, DELAYED RELEASE ORAL at 08:18

## 2018-09-15 RX ADMIN — ALBUTEROL SULFATE SCH MG: 2.5 SOLUTION RESPIRATORY (INHALATION) at 12:20

## 2018-09-15 RX ADMIN — Medication SCH MG: at 08:18

## 2018-09-15 RX ADMIN — HYDROMORPHONE HYDROCHLORIDE PRN MG: 1 INJECTION, SOLUTION INTRAMUSCULAR; INTRAVENOUS; SUBCUTANEOUS at 13:14

## 2018-09-15 RX ADMIN — Medication SCH MG: at 14:43

## 2018-09-15 RX ADMIN — Medication SCH MG: at 07:32

## 2018-09-15 RX ADMIN — HYDROMORPHONE HYDROCHLORIDE PRN MG: 1 INJECTION, SOLUTION INTRAMUSCULAR; INTRAVENOUS; SUBCUTANEOUS at 17:47

## 2018-09-15 RX ADMIN — CYCLOBENZAPRINE HYDROCHLORIDE PRN MG: 10 TABLET, FILM COATED ORAL at 10:08

## 2018-09-15 RX ADMIN — DEXTROSE MONOHYDRATE SCH MLS/HR: 50 INJECTION, SOLUTION INTRAVENOUS at 09:11

## 2018-09-15 RX ADMIN — Medication SCH MG: at 15:00

## 2018-09-15 RX ADMIN — Medication SCH MLS/HR: at 15:22

## 2018-09-15 RX ADMIN — ALBUTEROL SULFATE SCH MG: 2.5 SOLUTION RESPIRATORY (INHALATION) at 23:30

## 2018-09-15 RX ADMIN — Medication SCH MG: at 03:12

## 2018-09-15 NOTE — NUR
INFORMED DR. SUÁREZ REGARDING DISCHARGE ORDER. DR. SUÁREZ HAS NOT APPROVED DISCHARGE YET. 

-------------------------------------------------------------------------------

Addendum: 09/15/18 at 1853 by JUSTINO HUTSON RN

-------------------------------------------------------------------------------

INFORMED DR. PARISA RAY

## 2018-09-15 NOTE — NUR
RN NOTES



RECEIVED PT OUT OF BED SITTING IN THE CHAIR. PT ALERT AND ORIENTED X4, ON ROOM AIR AND 
TOLERATED WELL. PT VERBALIZED OF TOLERABLE PAIN ON HIS LOWER BACK 4/10. CURRENT DIET 
TOLERATED WELL, DENIES NAUSEA AND VOMITING. COLOSTOMY INTACT AND WORKING WELL. DAPHNEY DRAIN 
INTACT WITH SERO SANGUINOUS OUTPUT NOTED. IV ACCESS ON LEFT HAND PATENT AND INTACT. SAFETY 
MEASURES AND FALL PRECAUTION OBSERVED. PLAN OF CARE DISCUSSED WITH THE PT AND VERBALIZED 
UNDERSTANDING. WILL CONTINUE TO MONITOR PT.

## 2018-09-15 NOTE — NUR
MS RN CLOSING NOTES



PT COMFORTABLY ASLEEP AND EASILY AWAKEN, STABLE, NOT IN DISTRESS. TOLERATING ROOM AIR 99%. 
RESPIRATION EVEN AND UNLABORED. KEPT CLEAN AND DRY AND COMFORTABLE, ALL NURSING CARE 
RENDERED. NEEDS ATTENDED AND ANTICIPATED. GOOD SKIN CARE PROVIDED. NO COMPLAIN OF PAIN AT 
THIS TIME. ON LOW BED AT ALL TIMES TO ENSURE SAFETY. SAFE HAZARD FREE ENVIRONMENT PROVIDED. 
CALL LIGHT WITHIN EASY TO REACH. WILL ENDORSE NEXT SHIFT CONTINUITY OF CARE.

## 2018-09-15 NOTE — NUR
MS RN OPENING NOTES

RECEIVED PATIENT IN STABLE CONDITION. IN NO APPARENT DISTRESS. BEDSIDE RAILS ARE UPX2. BED 
IS LOCKED AND LOWERED. CALL LIGHT IS WITHIN REACH. IV LINE IS INTACT AND PATENT. WILL 
CONTINUE TO MONITOR PATIENT.

## 2018-09-15 NOTE — NUR
MS RN CLOSING NOTES

PATIENT IS IN STABLE CONDITION. IN NO APPARENT DISTRESS. BEDSIDE RAILS ARE UPX2. BED IS 
LOCKED AND LOWERED. CALL LIGHT IS WITHIN REACH. IV LINE IS INTACT AND PATENT. ALL NEEDS WERE 
MET. WILL ENDORSE CARE TO NIGHT SHIFT NURSE FOR FANTA.

## 2018-09-16 VITALS — DIASTOLIC BLOOD PRESSURE: 90 MMHG | SYSTOLIC BLOOD PRESSURE: 170 MMHG

## 2018-09-16 VITALS — SYSTOLIC BLOOD PRESSURE: 112 MMHG | DIASTOLIC BLOOD PRESSURE: 84 MMHG

## 2018-09-16 VITALS — DIASTOLIC BLOOD PRESSURE: 86 MMHG | SYSTOLIC BLOOD PRESSURE: 151 MMHG

## 2018-09-16 VITALS — SYSTOLIC BLOOD PRESSURE: 151 MMHG | DIASTOLIC BLOOD PRESSURE: 86 MMHG

## 2018-09-16 LAB
BUN SERPL-MCNC: 13 MG/DL (ref 7–18)
CALCIUM SERPL-MCNC: 8.9 MG/DL (ref 8.5–10.1)
CHLORIDE SERPL-SCNC: 98 MMOL/L (ref 98–107)
CO2 SERPL-SCNC: 24 MMOL/L (ref 21–32)
CREAT SERPL-MCNC: 1 MG/DL (ref 0.6–1.3)
GLUCOSE SERPL-MCNC: 118 MG/DL (ref 74–106)
POTASSIUM SERPL-SCNC: 4.9 MMOL/L (ref 3.5–5.1)
SODIUM SERPL-SCNC: 129 MMOL/L (ref 136–145)

## 2018-09-16 RX ADMIN — ALBUTEROL SULFATE SCH MG: 2.5 SOLUTION RESPIRATORY (INHALATION) at 15:30

## 2018-09-16 RX ADMIN — Medication SCH MG: at 03:00

## 2018-09-16 RX ADMIN — Medication SCH MG: at 16:00

## 2018-09-16 RX ADMIN — DEXTROSE MONOHYDRATE SCH MLS/HR: 50 INJECTION, SOLUTION INTRAVENOUS at 01:43

## 2018-09-16 RX ADMIN — Medication SCH MG: at 23:28

## 2018-09-16 RX ADMIN — HYDROCODONE BITARTRATE AND ACETAMINOPHEN PRN EA: 10; 325 TABLET ORAL at 01:42

## 2018-09-16 RX ADMIN — MEROPENEM SCH MLS/HR: 1 INJECTION INTRAVENOUS at 13:16

## 2018-09-16 RX ADMIN — HYDROMORPHONE HYDROCHLORIDE PRN MG: 1 INJECTION, SOLUTION INTRAMUSCULAR; INTRAVENOUS; SUBCUTANEOUS at 18:39

## 2018-09-16 RX ADMIN — Medication SCH MG: at 15:30

## 2018-09-16 RX ADMIN — ALBUTEROL SULFATE SCH MG: 2.5 SOLUTION RESPIRATORY (INHALATION) at 11:57

## 2018-09-16 RX ADMIN — Medication SCH MG: at 15:00

## 2018-09-16 RX ADMIN — MEROPENEM SCH MLS/HR: 1 INJECTION INTRAVENOUS at 04:51

## 2018-09-16 RX ADMIN — Medication SCH MG: at 11:57

## 2018-09-16 RX ADMIN — Medication SCH MG: at 20:20

## 2018-09-16 RX ADMIN — MEROPENEM SCH MLS/HR: 1 INJECTION INTRAVENOUS at 20:16

## 2018-09-16 RX ADMIN — Medication SCH MG: at 08:10

## 2018-09-16 RX ADMIN — PANTOPRAZOLE SODIUM SCH MG: 40 TABLET, DELAYED RELEASE ORAL at 08:39

## 2018-09-16 RX ADMIN — ALBUTEROL SULFATE SCH MG: 2.5 SOLUTION RESPIRATORY (INHALATION) at 20:16

## 2018-09-16 RX ADMIN — Medication SCH MG: at 02:42

## 2018-09-16 RX ADMIN — Medication SCH MG: at 08:39

## 2018-09-16 RX ADMIN — ALBUTEROL SULFATE SCH MG: 2.5 SOLUTION RESPIRATORY (INHALATION) at 02:42

## 2018-09-16 RX ADMIN — ALBUTEROL SULFATE SCH MG: 2.5 SOLUTION RESPIRATORY (INHALATION) at 23:28

## 2018-09-16 RX ADMIN — CYCLOBENZAPRINE HYDROCHLORIDE PRN MG: 10 TABLET, FILM COATED ORAL at 22:15

## 2018-09-16 RX ADMIN — ALBUTEROL SULFATE SCH MG: 2.5 SOLUTION RESPIRATORY (INHALATION) at 08:10

## 2018-09-16 RX ADMIN — DEXTROSE MONOHYDRATE SCH MLS/HR: 50 INJECTION, SOLUTION INTRAVENOUS at 10:39

## 2018-09-16 NOTE — NUR
MS RN OPENING NOTE



Patient was seen sitting in chair AAOx4, breathing comfortably on RA with no SOB, and no 
signs of acute distress. Patient states that pain level is only 1-2/10, and is alleviated 
with position change such as sitting up vs. lying down. Colostomy noted on left side of 
abdomen; stoma is beefy red color. Drain noted on LLQ with scant serosanguineous drainage. 
Abdominal incisions from laparoscopic sx are closed, edges well-approximated, minimal 
bruising, no s/s of infection. ADOLFO IV is intact and patent. Patient has no immediate 
needs/concerns at this time. Call bell is within reach. Will continue to monitor.

## 2018-09-16 NOTE — NUR
MS RN NOTE - Flexeril



Patient requested Flexeril for right sided abdominal pain/muscle cramping and muscle pain 
the low back. Patient states this pain is worse when lying down, and patient wishes to try 
to sleep in bed. 10mg PO Flexeril was given per prn orders. Will continue to monitor.

## 2018-09-16 NOTE — NUR
RN NOTES



PT COMPLAINS 7/10 PAIN ON HIS LOWER BACK, NORCO 10/325 MG TAB GIVEN PO. WILL CONTINUE TO 
MONITOR PT.

## 2018-09-16 NOTE — NUR
PT REFUSED RESP TX AT THIS TIME, DUE TO HIGH B/P. NO S/S OF SOB NOTED. WILL CONT TO MONITOR

-------------------------------------------------------------------------------

Addendum: 09/16/18 at 1639 by RUBÉN ARCHER RT

-------------------------------------------------------------------------------

Amended: Links added.

## 2018-09-16 NOTE — NUR
RN NOTES



PT PREFERS TO SLEEP IN THE CHAIR BECAUSE OF LOWER BACK PAIN. CURRENT DIET TOLERATED WELL, NO 
EPISODE OF NAUSEA AND VOMITING. COLOSTOMY INTACT AND WORKING WELL, PT ABLE  EMPTY COLOSTOMY. 
DAPHNEY DRAIN WITH MIN. OUTPUT. KEPT EMIGDIO AT TOLERABLE LEVEL. ALL NEEDS ATTENDED. WILL ENDORSE TO 
MORNING RN FOR CONTINUITY OF CARE.

## 2018-09-17 VITALS — DIASTOLIC BLOOD PRESSURE: 72 MMHG | SYSTOLIC BLOOD PRESSURE: 115 MMHG

## 2018-09-17 VITALS — SYSTOLIC BLOOD PRESSURE: 143 MMHG | DIASTOLIC BLOOD PRESSURE: 94 MMHG

## 2018-09-17 VITALS — DIASTOLIC BLOOD PRESSURE: 91 MMHG | SYSTOLIC BLOOD PRESSURE: 145 MMHG

## 2018-09-17 LAB
BASOPHILS # BLD AUTO: 0 /CMM (ref 0–0.2)
BASOPHILS NFR BLD AUTO: 0.5 % (ref 0–2)
EOSINOPHIL NFR BLD AUTO: 4.5 % (ref 0–6)
HCT VFR BLD AUTO: 37 % (ref 39–51)
HGB BLD-MCNC: 12 G/DL (ref 13.5–17.5)
LYMPHOCYTES NFR BLD AUTO: 1.7 /CMM (ref 0.8–4.8)
LYMPHOCYTES NFR BLD AUTO: 19 % (ref 20–44)
MCHC RBC AUTO-ENTMCNC: 33 G/DL (ref 31–36)
MCV RBC AUTO: 88 FL (ref 80–96)
MONOCYTES NFR BLD AUTO: 0.9 /CMM (ref 0.1–1.3)
MONOCYTES NFR BLD AUTO: 9.9 % (ref 2–12)
NEUTROPHILS # BLD AUTO: 6.2 /CMM (ref 1.8–8.9)
NEUTROPHILS NFR BLD AUTO: 66.1 % (ref 43–81)
PLATELET # BLD AUTO: 935 /CMM (ref 150–450)
RBC # BLD AUTO: 4.19 MIL/UL (ref 4.5–6)
RDW COEFFICIENT OF VARIATION: 12.5 (ref 11.5–15)
WBC NRBC COR # BLD AUTO: 9.2 K/UL (ref 4.3–11)

## 2018-09-17 RX ADMIN — Medication SCH MG: at 11:30

## 2018-09-17 RX ADMIN — Medication SCH MG: at 03:12

## 2018-09-17 RX ADMIN — PANTOPRAZOLE SODIUM SCH MG: 40 TABLET, DELAYED RELEASE ORAL at 09:42

## 2018-09-17 RX ADMIN — MEROPENEM SCH MLS/HR: 1 INJECTION INTRAVENOUS at 04:48

## 2018-09-17 RX ADMIN — Medication SCH MG: at 20:37

## 2018-09-17 RX ADMIN — Medication SCH MG: at 08:53

## 2018-09-17 RX ADMIN — Medication SCH MG: at 15:46

## 2018-09-17 RX ADMIN — Medication SCH MG: at 14:05

## 2018-09-17 RX ADMIN — HYDROMORPHONE HYDROCHLORIDE PRN MG: 1 INJECTION, SOLUTION INTRAMUSCULAR; INTRAVENOUS; SUBCUTANEOUS at 23:12

## 2018-09-17 RX ADMIN — Medication SCH MG: at 03:00

## 2018-09-17 RX ADMIN — MEROPENEM SCH MLS/HR: 1 INJECTION INTRAVENOUS at 13:57

## 2018-09-17 RX ADMIN — ALBUTEROL SULFATE SCH MG: 2.5 SOLUTION RESPIRATORY (INHALATION) at 11:30

## 2018-09-17 RX ADMIN — ALBUTEROL SULFATE SCH MG: 2.5 SOLUTION RESPIRATORY (INHALATION) at 20:37

## 2018-09-17 RX ADMIN — HYDROCODONE BITARTRATE AND ACETAMINOPHEN PRN EA: 10; 325 TABLET ORAL at 01:02

## 2018-09-17 RX ADMIN — Medication SCH MG: at 16:14

## 2018-09-17 RX ADMIN — ALBUTEROL SULFATE SCH MG: 2.5 SOLUTION RESPIRATORY (INHALATION) at 03:12

## 2018-09-17 RX ADMIN — HYDROCODONE BITARTRATE AND ACETAMINOPHEN PRN EA: 10; 325 TABLET ORAL at 21:06

## 2018-09-17 RX ADMIN — MEROPENEM SCH MLS/HR: 1 INJECTION INTRAVENOUS at 20:57

## 2018-09-17 RX ADMIN — ALBUTEROL SULFATE SCH MG: 2.5 SOLUTION RESPIRATORY (INHALATION) at 08:53

## 2018-09-17 RX ADMIN — HYDROCODONE BITARTRATE AND ACETAMINOPHEN PRN EA: 10; 325 TABLET ORAL at 06:14

## 2018-09-17 RX ADMIN — Medication SCH MG: at 09:42

## 2018-09-17 RX ADMIN — ALBUTEROL SULFATE SCH MG: 2.5 SOLUTION RESPIRATORY (INHALATION) at 15:46

## 2018-09-17 NOTE — NUR
MS RN CLOSING NOTE



Patient is lying in bed AAOx4, breathing comfortably on RA with no SOB, and no signs of 
acute distress. Patient slept intermittently overnight, but had no complications. Pain has 
been well controlled this shift with position change and prn Norco. All patient needs have 
been addressed this shift. Call bell is within reach. Patient care endorsed to day shift RN.

## 2018-09-17 NOTE — NUR
PT REFUSED RESP TX AFTER BEING WOKEN UP. PT STATED HE WANTED TO CONT SLEEPING. NO S/S OF SOB 
NOTED. WILL CONT TO MONITOR

-------------------------------------------------------------------------------

Addendum: 09/17/18 at 1142 by RUBÉN ARCHER RT

-------------------------------------------------------------------------------

Amended: Links added.

## 2018-09-17 NOTE — NUR
MS RN NOTE - Norco



Patient requested Norco for low back pain and pain on his right side, aggravated by lying 
down, but patient is trying to sleep. PO Norco 10/325mg administered per prn orders.

## 2018-09-17 NOTE — NUR
MSRSHANIQUA. PT PREPARED FOR D/C AS PER MD. PICC LINE UNSUITABLE. CASE MANAGEMENT CONTACTED HOME 
INFUSION GROUP AND OK'D PT TO LEAVE WITH PERIPHERAL IV. HOME INF CAN REPLACE AS NEEDED. PT 
THEN STATED HE HAS SWELLING AND TENDERNESS TO LEFT FOREARM FROM PREVIOUS IVC X5DAYS AGO. 
ALSO PT HAS SMALL HARD MASS IN VEIN IN UPPER RIGHT ARM WHERE PREVIOUS IVC WAS PLACE. 

MD PARKS MADE AWARE AND OK'D TO STAY UNTIL AM FOR RV.

## 2018-09-17 NOTE — NUR
MS2/RN

RECEIVE PATIENT AWAKE, ALERT ORIENTED, COMFORTABLE, NO C/O PAIN AT THIS TIME, NO DISTRESS 
NOTED, CALL LIGHT IN REACH. WILL MONITOR.

## 2018-09-17 NOTE — NUR
Patient evaluated for PICC insertion prior to discharge and noted that bilateral upper arm 
veins are non-compressible. Informed patient about possible DVT complications if PICC 
insertion is pursued. patient verbalized understanding, RN is aware.

## 2018-09-18 VITALS — SYSTOLIC BLOOD PRESSURE: 143 MMHG | DIASTOLIC BLOOD PRESSURE: 99 MMHG

## 2018-09-18 VITALS — SYSTOLIC BLOOD PRESSURE: 130 MMHG | DIASTOLIC BLOOD PRESSURE: 88 MMHG

## 2018-09-18 VITALS — DIASTOLIC BLOOD PRESSURE: 70 MMHG | SYSTOLIC BLOOD PRESSURE: 121 MMHG

## 2018-09-18 RX ADMIN — HYDROMORPHONE HYDROCHLORIDE PRN MG: 1 INJECTION, SOLUTION INTRAMUSCULAR; INTRAVENOUS; SUBCUTANEOUS at 16:44

## 2018-09-18 RX ADMIN — Medication SCH MG: at 20:21

## 2018-09-18 RX ADMIN — HYDROMORPHONE HYDROCHLORIDE PRN MG: 1 INJECTION, SOLUTION INTRAMUSCULAR; INTRAVENOUS; SUBCUTANEOUS at 05:05

## 2018-09-18 RX ADMIN — ALBUTEROL SULFATE SCH MG: 2.5 SOLUTION RESPIRATORY (INHALATION) at 11:32

## 2018-09-18 RX ADMIN — ALBUTEROL SULFATE SCH MG: 2.5 SOLUTION RESPIRATORY (INHALATION) at 08:43

## 2018-09-18 RX ADMIN — MEROPENEM SCH MLS/HR: 1 INJECTION INTRAVENOUS at 12:22

## 2018-09-18 RX ADMIN — CYCLOBENZAPRINE HYDROCHLORIDE PRN MG: 10 TABLET, FILM COATED ORAL at 02:46

## 2018-09-18 RX ADMIN — Medication SCH MG: at 08:43

## 2018-09-18 RX ADMIN — ALBUTEROL SULFATE SCH MG: 2.5 SOLUTION RESPIRATORY (INHALATION) at 00:18

## 2018-09-18 RX ADMIN — Medication SCH MG: at 15:00

## 2018-09-18 RX ADMIN — ALBUTEROL SULFATE SCH MG: 2.5 SOLUTION RESPIRATORY (INHALATION) at 03:30

## 2018-09-18 RX ADMIN — Medication SCH MG: at 03:00

## 2018-09-18 RX ADMIN — PANTOPRAZOLE SODIUM SCH MG: 40 TABLET, DELAYED RELEASE ORAL at 08:42

## 2018-09-18 RX ADMIN — Medication SCH MG: at 00:18

## 2018-09-18 RX ADMIN — Medication SCH MG: at 14:37

## 2018-09-18 RX ADMIN — ALBUTEROL SULFATE SCH MG: 2.5 SOLUTION RESPIRATORY (INHALATION) at 20:21

## 2018-09-18 RX ADMIN — Medication SCH MG: at 11:32

## 2018-09-18 RX ADMIN — Medication SCH MG: at 03:30

## 2018-09-18 RX ADMIN — ALBUTEROL SULFATE SCH MG: 2.5 SOLUTION RESPIRATORY (INHALATION) at 14:37

## 2018-09-18 RX ADMIN — MEROPENEM SCH MLS/HR: 1 INJECTION INTRAVENOUS at 04:55

## 2018-09-18 RX ADMIN — HYDROCODONE BITARTRATE AND ACETAMINOPHEN PRN EA: 10; 325 TABLET ORAL at 20:40

## 2018-09-18 RX ADMIN — Medication SCH MG: at 08:42

## 2018-09-18 RX ADMIN — Medication SCH MG: at 16:43

## 2018-09-18 NOTE — NUR
MS RN NOTES

RECEIVED AMBULATING IN THE HALLWAY,A/O X4,AWAITING FOR DUPLEX STUDY ON BOTH UPPER 
EXTREMITIES DUE TO PAIN AND SWELLING.WITJ SALINE LOCK RIGHT UPPER ARM FOR MEDS.COLOSTOMY BAG 
IN PLACE,EMPTY AT THIS TIME.CALL LIGHT IN REACH NEEDS ANTICIPATED.

## 2018-09-18 NOTE — NUR
RT NOTE



PATIENT REFUSING TREATMENT. PRIMARY RN HUMBERTO NOTIFIED. NO SIGNS OF RESPIRATORY DISTRESS 
NOTED AT THIS TIME. SPO2 97% ON ROOM AIR. WILL CONTINUE TO MONITOR.

## 2018-09-18 NOTE — NUR
MS RN NOTES



PATIENT RESTING INSIDE ROOM. AWAKE, ALERT AND ORIENTED X 4. VERBALLY RESPONSIVE AND RESPONDS 
TO VERBAL AND TACTILE STIMULI. BREATHING EVEN AND UNLABORED. NO SOB OR ACUTE DISTRESS. 
DENIES ANY PAIN OR DISCOMFORT. NO CHANGES IN LOC NOTED. PATIENT CALM AND RELAXED. IV INTACT 
AND PATENT. PATIENT FOR DISCHARGE TO HOME TODAY. DR. BUCKNER AWARE OF PATIENT DISCHARGING 
WITH IV IN PLACE AND GAVE OK. PATIENT AWARE AND VERBALIZED UNDERSTANDING. ENDORSED TO 
INCOMING SHIFT FOR FANTA. BED LOCKED AND IN LOW POSITION. BILATERAL UPPER SIDE RAILS UP AND 
LOCKED. CALL LIGHT WITHIN EASY REACH

## 2018-09-18 NOTE — NUR
MS RN NOTES



PATIENT RECEIVED RESTING INSIDE ROOM. AWAKE, ALERT AND ORIENTED. VERBALLY RESPONSIVE AND 
RESPONDS TO VERBAL AND TACTILE STIMULI. BREATHING EVEN AND UNLABORED. NO SOB OR ACUTE 
DISTRESS AT THIS TIME. PATIENT CALM AND RELAXED. NO CHANGES IN LOC NOTED AT THIS TIME. 
PATIENT DENIES ANY PAIN OR DISCOMFORT. IV INTACT AND PATENT, NO SWELLING OR BLEEDING NOTED 
ON SITE. WILL CONTINUE TO MONITOR. BED LOCKED AND IN LOW POSITION. BILATERAL UPPER SIDE 
RAILS UP AND LOCKED. CALL LIGHT WITHIN EASY REACH

## 2018-09-18 NOTE — NUR
MS RN NOTES

DUPLEX U/S ON BOTH ARMS WAS DONE AND TURNS OUT TO BE NEGATIVE FOR DVT.PATIENT READY FOR D/C 
HOME.ALL [employer] WORKS WAS SIGNED AND GIVEN COPY.

## 2018-09-18 NOTE — NUR
MS RN NOTES

DISCHARGE HOME WITH RIGHT UPPER ARM SALINE FOR HOME HEALTH IV ANTIBIOTICS AS ORDERED BY MD.

## 2018-09-18 NOTE — NUR
MS2/RN

PATIENT IS SLEEPING AT THIS TIME, EASILY AROUSABLE, APPEAR COMFORTABLE, NO SIGNS OF DISTRESS 
NOTED, CALL LIGHT IN REACH. ALL NEEDS ATTENDED AT THIS TIME. WILL CONTINUE TO MONITOR.

## 2018-09-18 NOTE — NUR
MS RN NOTES C/O ABDOMINAL PAIN 8/10 ON PAIN SCALE.MEDICATED WITH NORCO 10/325MG 1 TAB PO AS 
ORDERED FOR SEVERE PAIN

## 2018-09-20 ENCOUNTER — HOSPITAL ENCOUNTER (OUTPATIENT)
Dept: HOSPITAL 54 - MSC | Age: 48
Discharge: HOME | End: 2018-09-20
Attending: INTERNAL MEDICINE
Payer: MEDICAID

## 2018-09-20 VITALS — SYSTOLIC BLOOD PRESSURE: 144 MMHG | DIASTOLIC BLOOD PRESSURE: 98 MMHG

## 2018-09-20 DIAGNOSIS — G89.18: ICD-10-CM

## 2018-09-20 DIAGNOSIS — K57.90: ICD-10-CM

## 2018-09-20 DIAGNOSIS — Z51.89: Primary | ICD-10-CM

## 2024-09-23 NOTE — NUR
TELE RN ADMITTING NOTES



PATIENT ADMITTED TO UNIT [facility] AT 1600 FROM SURGERY S/P EXPLORE LAPAROSCOPY PARTIAL 
SIGMOID RESECTION AND COLOSTOMY BY DR SUÁREZ. PT ACCOMPANIED BY HIS FRIEND. PT IS LETHARGIC 
BUT ABLE TO RESPONSE TO VERBAL STIMULI COHERENTLY, NO C/O PAIN AT THIS TIME. PT WITH DX OF 
PERFORATED APPENDIX AND FROM E.R. HE WAS SENT STRAIGHT TO SURGERY. PT ON 0N 02 VIA N./C @ 
3LPM AT THIS TIME, BREATHING EVEN AND UNLABORED. V/S TAKEN AND RECORDED.  SKIN ASSESSMENT 
DONE. PT PLACED ON TELE-MONITORING WITH READING OF SR WITH HR OF 88-95 NOTED. SURGICAL 
INCISIONS NOTED ON UPPER MID ABDOMEN AND UMBILICUS AREA. PT WITH 2 DAPHNEY DRAINS ON LEFT UPPER 
QUADRANT AND LOWER MID ABDOMEN WITH DRESSINGS INTACT, CLEAN AND DRY. SMALL AMOUNT OF 
SEROSANGUINEOUS DRAINAGE NOTED ON BOTH DRAINS.  ABDOMEN SOFT AND NON-DISTENDED. PT WITH 
COLOSTOMY IN PLACE, NO OUTPUT NOTED AT THIS TIME. B/L LUNGS CLEAR ON AUSCULTATION. IV ACCESS 
ON LEFT AC g#18 INTACT AND PATENT, IVF OF NS BOLUS RUNNING AT THIS TIME. SAFETY MEASURES 
INITIATED, HOB ELEVATED. BED IN LOWEST LOCK POSITION WITH SIDE-RAILS UP X2. ALL MD POST OP 
MD ORDERS CARRIED OUT. WILL CONTINUE TO MONITOR PT ACCORDINGLY. Patient